# Patient Record
Sex: MALE | Race: WHITE
[De-identification: names, ages, dates, MRNs, and addresses within clinical notes are randomized per-mention and may not be internally consistent; named-entity substitution may affect disease eponyms.]

---

## 2019-09-21 ENCOUNTER — HOSPITAL ENCOUNTER (INPATIENT)
Dept: HOSPITAL 47 - EC | Age: 84
LOS: 4 days | Discharge: HOME HEALTH | DRG: 190 | End: 2019-09-25
Attending: INTERNAL MEDICINE | Admitting: INTERNAL MEDICINE
Payer: MEDICARE

## 2019-09-21 VITALS — BODY MASS INDEX: 27.9 KG/M2

## 2019-09-21 DIAGNOSIS — J20.9: ICD-10-CM

## 2019-09-21 DIAGNOSIS — I11.0: ICD-10-CM

## 2019-09-21 DIAGNOSIS — F03.90: ICD-10-CM

## 2019-09-21 DIAGNOSIS — R44.3: ICD-10-CM

## 2019-09-21 DIAGNOSIS — Z79.52: ICD-10-CM

## 2019-09-21 DIAGNOSIS — Z95.1: ICD-10-CM

## 2019-09-21 DIAGNOSIS — Z95.810: ICD-10-CM

## 2019-09-21 DIAGNOSIS — T38.0X5A: ICD-10-CM

## 2019-09-21 DIAGNOSIS — Z79.02: ICD-10-CM

## 2019-09-21 DIAGNOSIS — Z79.899: ICD-10-CM

## 2019-09-21 DIAGNOSIS — J96.01: ICD-10-CM

## 2019-09-21 DIAGNOSIS — Z86.73: ICD-10-CM

## 2019-09-21 DIAGNOSIS — I25.10: ICD-10-CM

## 2019-09-21 DIAGNOSIS — E66.9: ICD-10-CM

## 2019-09-21 DIAGNOSIS — I50.9: ICD-10-CM

## 2019-09-21 DIAGNOSIS — E78.5: ICD-10-CM

## 2019-09-21 DIAGNOSIS — J44.1: Primary | ICD-10-CM

## 2019-09-21 DIAGNOSIS — J44.0: ICD-10-CM

## 2019-09-21 DIAGNOSIS — Z95.5: ICD-10-CM

## 2019-09-21 DIAGNOSIS — R41.0: ICD-10-CM

## 2019-09-21 LAB
ALBUMIN SERPL-MCNC: 4.1 G/DL (ref 3.5–5)
ALP SERPL-CCNC: 107 U/L (ref 38–126)
ALT SERPL-CCNC: 52 U/L (ref 21–72)
ANION GAP SERPL CALC-SCNC: 15 MMOL/L
APTT BLD: 22.3 SEC (ref 22–30)
AST SERPL-CCNC: 51 U/L (ref 17–59)
BASOPHILS # BLD AUTO: 0 K/UL (ref 0–0.2)
BASOPHILS NFR BLD AUTO: 0 %
BUN SERPL-SCNC: 26 MG/DL (ref 9–20)
CALCIUM SPEC-MCNC: 9.7 MG/DL (ref 8.4–10.2)
CHLORIDE SERPL-SCNC: 103 MMOL/L (ref 98–107)
CK SERPL-CCNC: 47 U/L (ref 55–170)
CO2 SERPL-SCNC: 23 MMOL/L (ref 22–30)
EOSINOPHIL # BLD AUTO: 0.1 K/UL (ref 0–0.7)
EOSINOPHIL NFR BLD AUTO: 1 %
ERYTHROCYTE [DISTWIDTH] IN BLOOD BY AUTOMATED COUNT: 3.87 M/UL (ref 4.3–5.9)
ERYTHROCYTE [DISTWIDTH] IN BLOOD: 15.3 % (ref 11.5–15.5)
GLUCOSE SERPL-MCNC: 130 MG/DL (ref 74–99)
HCT VFR BLD AUTO: 36.7 % (ref 39–53)
HGB BLD-MCNC: 12.6 GM/DL (ref 13–17.5)
INR PPP: 1 (ref ?–1.2)
LYMPHOCYTES # SPEC AUTO: 1 K/UL (ref 1–4.8)
LYMPHOCYTES NFR SPEC AUTO: 10 %
MAGNESIUM SPEC-SCNC: 1.6 MG/DL (ref 1.6–2.3)
MCH RBC QN AUTO: 32.6 PG (ref 25–35)
MCHC RBC AUTO-ENTMCNC: 34.4 G/DL (ref 31–37)
MCV RBC AUTO: 95 FL (ref 80–100)
MONOCYTES # BLD AUTO: 0.2 K/UL (ref 0–1)
MONOCYTES NFR BLD AUTO: 3 %
NEUTROPHILS # BLD AUTO: 8.5 K/UL (ref 1.3–7.7)
NEUTROPHILS NFR BLD AUTO: 85 %
PLATELET # BLD AUTO: 212 K/UL (ref 150–450)
POTASSIUM SERPL-SCNC: 4.4 MMOL/L (ref 3.5–5.1)
PROT SERPL-MCNC: 8.9 G/DL (ref 6.3–8.2)
PT BLD: 10.6 SEC (ref 9–12)
SODIUM SERPL-SCNC: 141 MMOL/L (ref 137–145)
WBC # BLD AUTO: 9.9 K/UL (ref 3.8–10.6)

## 2019-09-21 PROCEDURE — 96375 TX/PRO/DX INJ NEW DRUG ADDON: CPT

## 2019-09-21 PROCEDURE — 85610 PROTHROMBIN TIME: CPT

## 2019-09-21 PROCEDURE — 80048 BASIC METABOLIC PNL TOTAL CA: CPT

## 2019-09-21 PROCEDURE — 96365 THER/PROPH/DIAG IV INF INIT: CPT

## 2019-09-21 PROCEDURE — 80053 COMPREHEN METABOLIC PANEL: CPT

## 2019-09-21 PROCEDURE — 71046 X-RAY EXAM CHEST 2 VIEWS: CPT

## 2019-09-21 PROCEDURE — 87040 BLOOD CULTURE FOR BACTERIA: CPT

## 2019-09-21 PROCEDURE — 85730 THROMBOPLASTIN TIME PARTIAL: CPT

## 2019-09-21 PROCEDURE — 85025 COMPLETE CBC W/AUTO DIFF WBC: CPT

## 2019-09-21 PROCEDURE — 94760 N-INVAS EAR/PLS OXIMETRY 1: CPT

## 2019-09-21 PROCEDURE — 84484 ASSAY OF TROPONIN QUANT: CPT

## 2019-09-21 PROCEDURE — 94640 AIRWAY INHALATION TREATMENT: CPT

## 2019-09-21 PROCEDURE — 82550 ASSAY OF CK (CPK): CPT

## 2019-09-21 PROCEDURE — 36415 COLL VENOUS BLD VENIPUNCTURE: CPT

## 2019-09-21 PROCEDURE — 99285 EMERGENCY DEPT VISIT HI MDM: CPT

## 2019-09-21 PROCEDURE — 83880 ASSAY OF NATRIURETIC PEPTIDE: CPT

## 2019-09-21 PROCEDURE — 83735 ASSAY OF MAGNESIUM: CPT

## 2019-09-21 PROCEDURE — 93005 ELECTROCARDIOGRAM TRACING: CPT

## 2019-09-21 RX ADMIN — MIRTAZAPINE SCH MG: 15 TABLET, FILM COATED ORAL at 20:09

## 2019-09-21 RX ADMIN — METHYLPREDNISOLONE SODIUM SUCCINATE SCH MG: 40 INJECTION, POWDER, FOR SOLUTION INTRAMUSCULAR; INTRAVENOUS at 23:26

## 2019-09-21 RX ADMIN — LISINOPRIL SCH MG: 20 TABLET ORAL at 20:10

## 2019-09-21 RX ADMIN — IPRATROPIUM BROMIDE AND ALBUTEROL SULFATE SCH ML: .5; 3 SOLUTION RESPIRATORY (INHALATION) at 15:30

## 2019-09-21 RX ADMIN — ATENOLOL SCH MG: 50 TABLET ORAL at 20:10

## 2019-09-21 RX ADMIN — ATORVASTATIN CALCIUM SCH MG: 20 TABLET, FILM COATED ORAL at 20:10

## 2019-09-21 RX ADMIN — Medication SCH MG: at 20:10

## 2019-09-21 RX ADMIN — IPRATROPIUM BROMIDE AND ALBUTEROL SULFATE SCH ML: .5; 3 SOLUTION RESPIRATORY (INHALATION) at 20:17

## 2019-09-21 NOTE — XR
EXAMINATION TYPE: XR chest 2V

 

DATE OF EXAM: 9/21/2019

 

HISTORY: difficulty breathing.

 

REFERENCE: NONE.

 

FINDINGS: There has been a midline sternotomy. There is a bipolar pacemaker place on the left.

 

The heart is mildly enlarged. The lungs are mildly overinflated but clear. Pleural space are clear.

 

IMPRESSION: 

1. COPD.

2. MILD CARDIOMEGALY.

## 2019-09-21 NOTE — ED
SOB HPI





- General


Chief Complaint: Shortness of Breath


Stated Complaint: SOB


Time Seen by Provider: 09/21/19 11:53


Source: patient, RN notes reviewed, old records reviewed


Mode of arrival: ambulatory


Limitations: no limitations





- History of Present Illness


Initial Comments: 





This is a 5-year-old male the ER for evaluation patient's a poor historian malcolm hector to dementia.  Patient presents with daughter from a  care facility for 

evaluation regarding low blood pressure increasing shortness of breath and left 

breast for infection with cough.  Patient himself is not getting any complaints 

currently.  But daughter states patient has had significant shortness of breath 

and had a cough for about a week may or may not have had a fever she is unsure. 

EMS records as well as transfer paperwork is patient is a nonsmoker with no 

history of COPD, no known significant sick contacts.  [facility] does [facility] 

[facility]


MD Complaint: shortness of breath, cough


-: week(s)


Severity: mild


Consistency: constant


Worsens With: exertion


Known History Of: congestive heart failure


Context: recent URI


Associated Symptoms: denies other symptoms





- Related Data


                                    Allergies











Allergy/AdvReac Type Severity Reaction Status Date / Time


 


Penicillins Allergy  Unknown Verified 09/21/19 11:51














Review of Systems


ROS Statement: 


Those systems with pertinent positive or pertinent negative responses have been 

documented in the HPI.





ROS Other: All systems not noted in ROS Statement are negative.





Past Medical History


Past Medical History: Chest Pain / Angina, Heart Failure, Hyperlipidemia, 

Hypertension


History of Any Multi-Drug Resistant Organisms: None Reported


Past Surgical History: No Surgical Hx Reported


Past Psychological History: No Psychological Hx Reported


Smoking Status: Never smoker


Past Alcohol Use History: None Reported


Past Drug Use History: None Reported





General Exam


Limitations: no limitations


General appearance: alert, in no apparent distress


Head exam: Present: atraumatic, normocephalic, normal inspection


Eye exam: Present: normal appearance, PERRL, EOMI.  Absent: scleral icterus, 

conjunctival injection, periorbital swelling


ENT exam: Present: normal exam, mucous membranes dry


Neck exam: Present: normal inspection.  Absent: tenderness, meningismus, 

lymphadenopathy


Respiratory exam: Present: respiratory distress, wheezes, rhonchi, accessory 

muscle use, decreased breath sounds, prolonged expiratory.  Absent: rales, 

stridor


Cardiovascular Exam: Present: regular rate, normal rhythm, normal heart sounds. 

Absent: systolic murmur, diastolic murmur, rubs, gallop, clicks


GI/Abdominal exam: Present: soft, normal bowel sounds.  Absent: distended, 

tenderness, guarding, rebound, rigid


Extremities exam: Present: normal inspection, full ROM, normal capillary refill.

 Absent: tenderness, pedal edema, joint swelling, calf tenderness


Back exam: Present: normal inspection


Neurological exam: Present: alert, oriented X3, CN II-XII intact


Psychiatric exam: Present: normal affect, normal mood


Skin exam: Present: warm, dry, intact, normal color.  Absent: rash





Course


                                   Vital Signs











  09/21/19 09/21/19 09/21/19





  11:47 12:32 12:50


 


Temperature 97.7 F  


 


Pulse Rate 71 93 99


 


Respiratory 24  





Rate   


 


Blood Pressure 161/78  


 


O2 Sat by Pulse 91 L  





Oximetry   














- Reevaluation(s)


Reevaluation #1: 





09/21/19 12:32


Medical records reviewed


Reevaluation #2: 





09/21/19 13:30


 does feel better with acute breathing treatment





Medical Decision Making





- Medical Decision Making





85 male the admitted for COPD exacerbation,.  Breathing Treatments and steroids





- Lab Data


Result diagrams: 


                                 09/21/19 12:15





                                 09/21/19 12:15


                                   Lab Results











  09/21/19 09/21/19 09/21/19 Range/Units





  12:15 12:15 12:15 


 


WBC  9.9    (3.8-10.6)  k/uL


 


RBC  3.87 L    (4.30-5.90)  m/uL


 


Hgb  12.6 L    (13.0-17.5)  gm/dL


 


Hct  36.7 L    (39.0-53.0)  %


 


MCV  95.0    (80.0-100.0)  fL


 


MCH  32.6    (25.0-35.0)  pg


 


MCHC  34.4    (31.0-37.0)  g/dL


 


RDW  15.3    (11.5-15.5)  %


 


Plt Count  212    (150-450)  k/uL


 


Neutrophils %  85    %


 


Lymphocytes %  10    %


 


Monocytes %  3    %


 


Eosinophils %  1    %


 


Basophils %  0    %


 


Neutrophils #  8.5 H    (1.3-7.7)  k/uL


 


Lymphocytes #  1.0    (1.0-4.8)  k/uL


 


Monocytes #  0.2    (0-1.0)  k/uL


 


Eosinophils #  0.1    (0-0.7)  k/uL


 


Basophils #  0.0    (0-0.2)  k/uL


 


PT    10.6  (9.0-12.0)  sec


 


INR    1.0  (<1.2)  


 


APTT    22.3  (22.0-30.0)  sec


 


Sodium   141   (137-145)  mmol/L


 


Potassium   4.4   (3.5-5.1)  mmol/L


 


Chloride   103   ()  mmol/L


 


Carbon Dioxide   23   (22-30)  mmol/L


 


Anion Gap   15   mmol/L


 


BUN   26 H   (9-20)  mg/dL


 


Creatinine   1.35 H   (0.66-1.25)  mg/dL


 


Est GFR (CKD-EPI)AfAm   55   (>60 ml/min/1.73 sqM)  


 


Est GFR (CKD-EPI)NonAf   48   (>60 ml/min/1.73 sqM)  


 


Glucose   130 H   (74-99)  mg/dL


 


Calcium   9.7   (8.4-10.2)  mg/dL


 


Magnesium   1.6   (1.6-2.3)  mg/dL


 


Total Bilirubin   0.5   (0.2-1.3)  mg/dL


 


AST   51   (17-59)  U/L


 


ALT   52   (21-72)  U/L


 


Alkaline Phosphatase   107   ()  U/L


 


Creatine Kinase   47 L   ()  U/L


 


Troponin I     (0.000-0.034)  ng/mL


 


Total Protein   8.9 H   (6.3-8.2)  g/dL


 


Albumin   4.1   (3.5-5.0)  g/dL














  09/21/19 Range/Units





  12:15 


 


WBC   (3.8-10.6)  k/uL


 


RBC   (4.30-5.90)  m/uL


 


Hgb   (13.0-17.5)  gm/dL


 


Hct   (39.0-53.0)  %


 


MCV   (80.0-100.0)  fL


 


MCH   (25.0-35.0)  pg


 


MCHC   (31.0-37.0)  g/dL


 


RDW   (11.5-15.5)  %


 


Plt Count   (150-450)  k/uL


 


Neutrophils %   %


 


Lymphocytes %   %


 


Monocytes %   %


 


Eosinophils %   %


 


Basophils %   %


 


Neutrophils #   (1.3-7.7)  k/uL


 


Lymphocytes #   (1.0-4.8)  k/uL


 


Monocytes #   (0-1.0)  k/uL


 


Eosinophils #   (0-0.7)  k/uL


 


Basophils #   (0-0.2)  k/uL


 


PT   (9.0-12.0)  sec


 


INR   (<1.2)  


 


APTT   (22.0-30.0)  sec


 


Sodium   (137-145)  mmol/L


 


Potassium   (3.5-5.1)  mmol/L


 


Chloride   ()  mmol/L


 


Carbon Dioxide   (22-30)  mmol/L


 


Anion Gap   mmol/L


 


BUN   (9-20)  mg/dL


 


Creatinine   (0.66-1.25)  mg/dL


 


Est GFR (CKD-EPI)AfAm   (>60 ml/min/1.73 sqM)  


 


Est GFR (CKD-EPI)NonAf   (>60 ml/min/1.73 sqM)  


 


Glucose   (74-99)  mg/dL


 


Calcium   (8.4-10.2)  mg/dL


 


Magnesium   (1.6-2.3)  mg/dL


 


Total Bilirubin   (0.2-1.3)  mg/dL


 


AST   (17-59)  U/L


 


ALT   (21-72)  U/L


 


Alkaline Phosphatase   ()  U/L


 


Creatine Kinase   ()  U/L


 


Troponin I  0.013  (0.000-0.034)  ng/mL


 


Total Protein   (6.3-8.2)  g/dL


 


Albumin   (3.5-5.0)  g/dL














- EKG Data


-: EKG Interpreted by Me (EKG shows paced rhythm rate of 65, , QRS 90, QTc

422)





- Radiology Data


Radiology results: report reviewed (Chest x-rays negative for acute disease), 

image reviewed





Disposition


Clinical Impression: 


 Acute exacerbation of chronic obstructive pulmonary disease





Disposition: ADMITTED AS IP TO THIS HOSP


Condition: Fair


Is patient prescribed a controlled substance at d/c from ED?: No


Referrals: 


Gaurang Mack MD [Primary Care Provider] - 1-2 days

## 2019-09-21 NOTE — P.HPIM
History of Present Illness


H&P Date: 09/21/19


Chief Complaint: Shortness of breath





Patient is a 85-year-old male with a known history of coronary artery disease 

with history of stent placement, history of CABG several years ago, chronic CHF 

with ejection fraction unknown, history of AICD placement, hypertension, hyperli

pidemia and dementia was brought to the hospital from senior living facility due

to complaints of shortness of breath and cough.  Patient was also to have low 

blood pressure at that facility.  Patient has been having significant shortness 

of breath and cough.  Past 1 week.  Patient was having subjective fevers which 

is unsure.  Patient was started on azithromycin and steroid prednisone but 

without much improvement.  Patient was also having leg swelling but it is not 

worsening recently.


Patient does not have a history of COPD or history of smoking in the past.


No fever admission.  No complaints of chest pain.  No nausea vomiting or 

abdominal pain or diarrhea.  No headache or dizziness or lightheadedness.





X-ray showed COPD and mild cardiomegaly.


EKG showed ventricular pacer rhythm.





Patient was given DuoNeb's breathing treatments and Solu-Medrol and IV fluid 

bolus in the ER.





BNP 2060





Review of Systems





Constitutional: Patient denies any fever or chills .  No generalized weakness or

weight loss.  


Abdomen: Patient denied nausea vomiting and diarrhea and abdominal pain.


Cardiovascular: Patient denies any chest pain or short of breath no pa

lpitations.  Swelling.


Respiratory: Patient does have cough without much sputum production.  Does have 

shortness of breath


Neurologic: Patient denied any numbness or tingling headache.


Musculoskeletal: Patient denies any complaints of joint swelling or deformity.


Skin: Negative


Psychiatric: Negative


Endocrine: No heat or cold intolerance.  No recent weight gain.


Genitourinary: No dysuria or hematuria.


All other 14 point ROS negative except the above





Past Medical History


Past Medical History: Cancer, Chest Pain / Angina, Heart Failure, CVA/TIA, 

Hyperlipidemia, Hypertension, Memory Impairment


History of Any Multi-Drug Resistant Organisms: None Reported


Past Surgical History: Heart Catheterization With Stent, Pacemaker


Additional Past Surgical History / Comment(s): Open Heart surgery


Past Anesthesia/Blood Transfusion Reactions: No Reported Reaction


Date of Last Stent Placement:: 2015


Type of Cardiac Device: Permanent Pacemaker


Device Placement Date:: 2015


Past Psychological History: No Psychological Hx Reported


Smoking Status: Never smoker


Past Alcohol Use History: None Reported


Past Drug Use History: None Reported





- Past Family History


  ** Father


Family Medical History: No Reported History





Medications and Allergies


                                Home Medications











 Medication  Instructions  Recorded  Confirmed  Type


 


Acetaminophen [Tylenol] 500 - 1,000 mg PO BID PRN 09/21/19 09/21/19 History


 


Albuterol Inhaler [Ventolin Hfa 1 - 2 puff INHALATION RT-Q6H PRN 09/21/19 09/21/19 History





Inhaler]    


 


Atenolol [Tenormin] 50 mg PO BID@0730,2000 09/21/19 09/21/19 History


 


Atorvastatin [Lipitor] 20 mg PO HS@2000 09/21/19 09/21/19 History


 


Azithromycin 250 mg PO DAILY@0800 09/21/19 09/21/19 History


 


Cholecalciferol (Vitamin D3) 2,000 unit PO DAILY@0730 09/21/19 09/21/19 History





[Vitamin D3]    


 


Clopidogrel Bisulfate [Plavix] 75 mg PO DAILY@0730 09/21/19 09/21/19 History


 


Enalapril Maleate [Vasotec] 20 mg PO BID@0800,2000 09/21/19 09/21/19 History


 


Folic Acid 0.4 mg PO DAILY@0730 09/21/19 09/21/19 History


 


Guaifenesin/Dextromethorphan 5 ml PO Q46H PRN 09/21/19 09/21/19 History





[guaiFENesin DM]    


 


Loratadine 10 mg PO DAILY@0800 09/21/19 09/21/19 History


 


Melatonin 10 mg PO HS@2000 09/21/19 09/21/19 History


 


Mirtazapine 30 mg PO HS@2000 09/21/19 09/21/19 History


 


amLODIPine [Norvasc] 5 mg PO BID@0730,2000 09/21/19 09/21/19 History


 


predniSONE 20 mg PO DAILY@0800 09/21/19 09/21/19 History








                                    Allergies











Allergy/AdvReac Type Severity Reaction Status Date / Time


 


Penicillins Allergy  Unknown Verified 09/21/19 13:31














Physical Exam


Vitals: 


                                   Vital Signs











  Temp Pulse Pulse Resp BP BP Pulse Ox


 


 09/21/19 15:41   88     


 


 09/21/19 15:30   92     


 


 09/21/19 15:25  98.1 F   70  16   160/76  95


 


 09/21/19 14:32   67   18  146/88   96


 


 09/21/19 13:58   65   18    91 L


 


 09/21/19 13:37  97.9 F  66   18  132/71   97


 


 09/21/19 12:50   99     


 


 09/21/19 12:32   93     


 


 09/21/19 11:47  97.7 F  71   24  161/78   91 L








                                Intake and Output











 09/21/19 09/21/19 09/21/19





 06:59 14:59 22:59


 


Other:   


 


  Weight  88.451 kg 














PHYSICAL EXAMINATION: 


Patient is lying in the bed comfortably, no acute distress, awake alert and 

oriented.  She is lethargic. 


HEENT: Normocephalic. Neck is supple. Pupils reactive. Nostrils clear. Oral 

cavity is moist. Ears reveal no drainage. 


Neck reveals no JVD, carotid bruits, or thyromegaly. 


CHEST EXAMINATION: Trachea is central. Symmetrical expansion.  Bilateral 

diminished air entry and expiratory wheezing and prolonged expiration.  No 

crackles or rhonchi.


CARDIAC: Normal S1, S2 with no gallops. No murmurs 


ABDOMEN: Soft. Bowel sounds normal. No organomegaly. No abdominal bruits. 


Extremities: 2+ edema.  No clubbing or cyanosis


Neurologically awake, alert, oriented x2-3 with well-coordinated movements.  No 

focal deficits noted.  Patient does have underlying cognitive impairment.


Skin: No rash or skin lesions. 


Psychiatric: Coperative.  Nonsuicidal


Musculoskeletal: No joint swelling or deformity.  Normal range of motion.








Results


CBC & Chem 7: 


                                 09/21/19 12:15





                                 09/21/19 12:15


Labs: 


                  Abnormal Lab Results - Last 24 Hours (Table)











  09/21/19 09/21/19 Range/Units





  12:15 12:15 


 


RBC  3.87 L   (4.30-5.90)  m/uL


 


Hgb  12.6 L   (13.0-17.5)  gm/dL


 


Hct  36.7 L   (39.0-53.0)  %


 


Neutrophils #  8.5 H   (1.3-7.7)  k/uL


 


BUN   26 H  (9-20)  mg/dL


 


Creatinine   1.35 H  (0.66-1.25)  mg/dL


 


Glucose   130 H  (74-99)  mg/dL


 


Creatine Kinase   47 L  ()  U/L


 


Total Protein   8.9 H  (6.3-8.2)  g/dL














Thrombosis Risk Factor Assmnt





- DVT/VTE Prophylaxis


DVT/VTE Prophylaxis: Pharmacologic Prophylaxis ordered





- Choose All That Apply


Any of the Below Risk Factors Present?: Yes


Each Factor Represents 1 point: Abnormal pulmonary function (COPD), Obesity (BMI

 >25)


Other Risk Factors: No


Other congenital or acquired thrombophilia - If yes, enter type in comment: No


Thrombosis Risk Factor Assessment Total Risk Factor Score: 2


Thrombosis Risk Factor Assessment Level: Low Risk





Assessment and Plan


Assessment: 





Acute tracheobronchitis with bronchospasm.  Possible underlying COPD with 

exacerbation.  Failed outpatient therapy.


Acute hypoxic respiratory failure secondary to above.


History of coronary artery disease status post CABG


History of AICD placement


History of CVA/TIA.


Hypertension


Dementia/memory impairment


Chronic CHF with ejection fraction unknown.


 DVT prophylaxis with Lovenox.





Plan:


Agent will be continued on IV Solu-Medrol and duo nebs.  Oxygen therapy.  Will 

hold IV fluids.  With antibiotics in the form of Levaquin.


Chest x-ray showed no evidence of CHF.  BNP is not significantly elevated.  Will

 continue the blood pressure medications and adjust dose.


Further recommendations based on the clinical course.  Prognosis is guarded.


Discussed with his son at bedside in detail.








Time with Patient: Greater than 30

## 2019-09-22 LAB
ANION GAP SERPL CALC-SCNC: 15 MMOL/L
BASOPHILS # BLD AUTO: 0 K/UL (ref 0–0.2)
BASOPHILS NFR BLD AUTO: 0 %
BUN SERPL-SCNC: 33 MG/DL (ref 9–20)
CALCIUM SPEC-MCNC: 9.6 MG/DL (ref 8.4–10.2)
CHLORIDE SERPL-SCNC: 101 MMOL/L (ref 98–107)
CO2 SERPL-SCNC: 25 MMOL/L (ref 22–30)
EOSINOPHIL # BLD AUTO: 0 K/UL (ref 0–0.7)
EOSINOPHIL NFR BLD AUTO: 0 %
ERYTHROCYTE [DISTWIDTH] IN BLOOD BY AUTOMATED COUNT: 3.64 M/UL (ref 4.3–5.9)
ERYTHROCYTE [DISTWIDTH] IN BLOOD: 15.2 % (ref 11.5–15.5)
GLUCOSE SERPL-MCNC: 147 MG/DL (ref 74–99)
HCT VFR BLD AUTO: 34.9 % (ref 39–53)
HGB BLD-MCNC: 11.3 GM/DL (ref 13–17.5)
LYMPHOCYTES # SPEC AUTO: 0.8 K/UL (ref 1–4.8)
LYMPHOCYTES NFR SPEC AUTO: 7 %
MCH RBC QN AUTO: 31 PG (ref 25–35)
MCHC RBC AUTO-ENTMCNC: 32.3 G/DL (ref 31–37)
MCV RBC AUTO: 95.8 FL (ref 80–100)
MONOCYTES # BLD AUTO: 0.3 K/UL (ref 0–1)
MONOCYTES NFR BLD AUTO: 3 %
NEUTROPHILS # BLD AUTO: 9.8 K/UL (ref 1.3–7.7)
NEUTROPHILS NFR BLD AUTO: 89 %
PLATELET # BLD AUTO: 220 K/UL (ref 150–450)
POTASSIUM SERPL-SCNC: 4.8 MMOL/L (ref 3.5–5.1)
SODIUM SERPL-SCNC: 141 MMOL/L (ref 137–145)
WBC # BLD AUTO: 10.9 K/UL (ref 3.8–10.6)

## 2019-09-22 RX ADMIN — IPRATROPIUM BROMIDE AND ALBUTEROL SULFATE SCH ML: .5; 3 SOLUTION RESPIRATORY (INHALATION) at 08:49

## 2019-09-22 RX ADMIN — ATORVASTATIN CALCIUM SCH MG: 20 TABLET, FILM COATED ORAL at 20:10

## 2019-09-22 RX ADMIN — METHYLPREDNISOLONE SODIUM SUCCINATE SCH MG: 40 INJECTION, POWDER, FOR SOLUTION INTRAMUSCULAR; INTRAVENOUS at 09:04

## 2019-09-22 RX ADMIN — ATENOLOL SCH MG: 50 TABLET ORAL at 20:10

## 2019-09-22 RX ADMIN — LORATADINE SCH MG: 10 TABLET ORAL at 09:03

## 2019-09-22 RX ADMIN — MIRTAZAPINE SCH MG: 15 TABLET, FILM COATED ORAL at 20:10

## 2019-09-22 RX ADMIN — LISINOPRIL SCH MG: 20 TABLET ORAL at 20:10

## 2019-09-22 RX ADMIN — Medication SCH MG: at 20:10

## 2019-09-22 RX ADMIN — ATENOLOL SCH MG: 50 TABLET ORAL at 09:02

## 2019-09-22 RX ADMIN — IPRATROPIUM BROMIDE AND ALBUTEROL SULFATE SCH ML: .5; 3 SOLUTION RESPIRATORY (INHALATION) at 16:23

## 2019-09-22 RX ADMIN — LISINOPRIL SCH MG: 20 TABLET ORAL at 09:02

## 2019-09-22 RX ADMIN — METHYLPREDNISOLONE SODIUM SUCCINATE SCH MG: 40 INJECTION, POWDER, FOR SOLUTION INTRAMUSCULAR; INTRAVENOUS at 22:55

## 2019-09-22 RX ADMIN — Medication SCH UNIT: at 09:04

## 2019-09-22 RX ADMIN — METHYLPREDNISOLONE SODIUM SUCCINATE SCH MG: 40 INJECTION, POWDER, FOR SOLUTION INTRAMUSCULAR; INTRAVENOUS at 16:53

## 2019-09-22 RX ADMIN — IPRATROPIUM BROMIDE AND ALBUTEROL SULFATE SCH ML: .5; 3 SOLUTION RESPIRATORY (INHALATION) at 20:38

## 2019-09-22 RX ADMIN — FOLIC ACID SCH MG: 1 TABLET ORAL at 09:02

## 2019-09-22 RX ADMIN — ENOXAPARIN SODIUM SCH MG: 40 INJECTION SUBCUTANEOUS at 09:04

## 2019-09-22 RX ADMIN — IPRATROPIUM BROMIDE AND ALBUTEROL SULFATE SCH ML: .5; 3 SOLUTION RESPIRATORY (INHALATION) at 12:50

## 2019-09-22 RX ADMIN — CLOPIDOGREL BISULFATE SCH MG: 75 TABLET ORAL at 09:02

## 2019-09-23 LAB
ANION GAP SERPL CALC-SCNC: 12 MMOL/L
BASOPHILS # BLD AUTO: 0 K/UL (ref 0–0.2)
BASOPHILS NFR BLD AUTO: 0 %
BUN SERPL-SCNC: 46 MG/DL (ref 9–20)
CALCIUM SPEC-MCNC: 9.7 MG/DL (ref 8.4–10.2)
CHLORIDE SERPL-SCNC: 101 MMOL/L (ref 98–107)
CO2 SERPL-SCNC: 27 MMOL/L (ref 22–30)
EOSINOPHIL # BLD AUTO: 0 K/UL (ref 0–0.7)
EOSINOPHIL NFR BLD AUTO: 0 %
ERYTHROCYTE [DISTWIDTH] IN BLOOD BY AUTOMATED COUNT: 3.64 M/UL (ref 4.3–5.9)
ERYTHROCYTE [DISTWIDTH] IN BLOOD: 16.5 % (ref 11.5–15.5)
GLUCOSE SERPL-MCNC: 141 MG/DL (ref 74–99)
HCT VFR BLD AUTO: 34.6 % (ref 39–53)
HGB BLD-MCNC: 11.4 GM/DL (ref 13–17.5)
LYMPHOCYTES # SPEC AUTO: 0.7 K/UL (ref 1–4.8)
LYMPHOCYTES NFR SPEC AUTO: 4 %
MCH RBC QN AUTO: 31.4 PG (ref 25–35)
MCHC RBC AUTO-ENTMCNC: 33 G/DL (ref 31–37)
MCV RBC AUTO: 95.2 FL (ref 80–100)
MONOCYTES # BLD AUTO: 0.8 K/UL (ref 0–1)
MONOCYTES NFR BLD AUTO: 5 %
NEUTROPHILS # BLD AUTO: 15.2 K/UL (ref 1.3–7.7)
NEUTROPHILS NFR BLD AUTO: 90 %
PLATELET # BLD AUTO: 225 K/UL (ref 150–450)
POTASSIUM SERPL-SCNC: 4.9 MMOL/L (ref 3.5–5.1)
SODIUM SERPL-SCNC: 140 MMOL/L (ref 137–145)
WBC # BLD AUTO: 16.8 K/UL (ref 3.8–10.6)

## 2019-09-23 RX ADMIN — ENOXAPARIN SODIUM SCH MG: 40 INJECTION SUBCUTANEOUS at 10:26

## 2019-09-23 RX ADMIN — LORATADINE SCH MG: 10 TABLET ORAL at 10:28

## 2019-09-23 RX ADMIN — CLOPIDOGREL BISULFATE SCH MG: 75 TABLET ORAL at 10:27

## 2019-09-23 RX ADMIN — Medication SCH UNIT: at 10:27

## 2019-09-23 RX ADMIN — IPRATROPIUM BROMIDE AND ALBUTEROL SULFATE SCH ML: .5; 3 SOLUTION RESPIRATORY (INHALATION) at 21:06

## 2019-09-23 RX ADMIN — IPRATROPIUM BROMIDE AND ALBUTEROL SULFATE SCH ML: .5; 3 SOLUTION RESPIRATORY (INHALATION) at 08:30

## 2019-09-23 RX ADMIN — ATENOLOL SCH MG: 50 TABLET ORAL at 20:37

## 2019-09-23 RX ADMIN — METHYLPREDNISOLONE SODIUM SUCCINATE SCH MG: 40 INJECTION, POWDER, FOR SOLUTION INTRAMUSCULAR; INTRAVENOUS at 10:26

## 2019-09-23 RX ADMIN — LISINOPRIL SCH MG: 20 TABLET ORAL at 20:37

## 2019-09-23 RX ADMIN — METHYLPREDNISOLONE SODIUM SUCCINATE SCH MG: 40 INJECTION, POWDER, FOR SOLUTION INTRAMUSCULAR; INTRAVENOUS at 23:47

## 2019-09-23 RX ADMIN — ATORVASTATIN CALCIUM SCH MG: 20 TABLET, FILM COATED ORAL at 20:37

## 2019-09-23 RX ADMIN — ATENOLOL SCH MG: 50 TABLET ORAL at 10:27

## 2019-09-23 RX ADMIN — MIRTAZAPINE SCH MG: 15 TABLET, FILM COATED ORAL at 20:37

## 2019-09-23 RX ADMIN — Medication SCH MG: at 20:39

## 2019-09-23 RX ADMIN — FOLIC ACID SCH MG: 1 TABLET ORAL at 10:27

## 2019-09-23 RX ADMIN — IPRATROPIUM BROMIDE AND ALBUTEROL SULFATE SCH ML: .5; 3 SOLUTION RESPIRATORY (INHALATION) at 15:40

## 2019-09-23 RX ADMIN — METHYLPREDNISOLONE SODIUM SUCCINATE SCH MG: 40 INJECTION, POWDER, FOR SOLUTION INTRAMUSCULAR; INTRAVENOUS at 16:50

## 2019-09-23 RX ADMIN — IPRATROPIUM BROMIDE AND ALBUTEROL SULFATE SCH ML: .5; 3 SOLUTION RESPIRATORY (INHALATION) at 12:31

## 2019-09-23 RX ADMIN — LISINOPRIL SCH MG: 20 TABLET ORAL at 10:26

## 2019-09-23 NOTE — P.PN
Subjective


Progress Note Date: 09/22/19


Principal diagnosis: 





Acute COPD exacerbation


Tracheobronchitis.  Failed outpatient therapy





Patient is a 85-year-old male with a known history of coronary artery disease 

with history of stent placement, history of CABG several years ago, chronic CHF 

with ejection fraction unknown, history of AICD placement, hypertension, 

hyperlipidemia and dementia was brought to the hospital from senior living 

facility due to complaints of shortness of breath and cough.  Patient was also 

to have low blood pressure at that facility.  Patient has been having 

significant shortness of breath and cough.  Past 1 week.  Patient was having 

subjective fevers which is unsure.  Patient was started on azithromycin and 

steroid prednisone but without much improvement.  Patient was also having leg sw

elling but it is not worsening recently.


Patient does not have a history of COPD or history of smoking in the past.


No fever admission.  No complaints of chest pain.  No nausea vomiting or 

abdominal pain or diarrhea.  No headache or dizziness or lightheadedness.





X-ray showed COPD and mild cardiomegaly.


EKG showed ventricular pacer rhythm.





Patient was given DuoNeb's breathing treatments and Solu-Medrol and IV fluid 

bolus in the ER.





BNP 2060 09/22/2019


Patient is currently sitting in the bed comfortably and is tolerating oral diet.

 Breathing status is much improved now able to walk to the bathroom but still 

dyspneic compared to normal.  Patient is being converted on IV steroids and 

breathing treatments.  Antibiotics in the form of Levaquin.


Continue with the oxygen therapy and gradually titrate down.  Anticipate 

discharge in next 24-48 hours.





Medications reviewed.





Objective





- Vital Signs


Vital signs: 


                                   Vital Signs











Temp  98.4 F   09/22/19 07:00


 


Pulse  80   09/22/19 16:35


 


Resp  16   09/22/19 07:00


 


BP  165/77   09/22/19 07:00


 


Pulse Ox  92 L  09/22/19 16:26








                                 Intake & Output











 09/21/19 09/22/19 09/22/19





 18:59 06:59 18:59


 


Intake Total  296 920


 


Balance  296 920


 


Weight 88.451 kg  


 


Intake:   


 


  Oral  296 920


 


Other:   


 


  Voiding Method Toilet Incontinent Incontinent


 


  # Voids  2 














- Exam





PHYSICAL EXAMINATION: 


Patient is lying in the bed comfortably, no acute distress, awake alert and 

oriented.. 


HEENT: Normocephalic. Neck is supple. Pupils reactive. Nostrils clear. Oral 

cavity is moist. Ears reveal no drainage. 


Neck reveals no JVD, carotid bruits, or thyromegaly. 


CHEST EXAMINATION: Trachea is central. Symmetrical expansion.  Expiratory wheeze

present.  Scattered rhonchi.  Otherwise Lung fields clear to auscultation and 

percussion. 


CARDIAC: Normal S1, S2 with no gallops. No murmurs 


ABDOMEN: Soft. Bowel sounds normal. No organomegaly. No abdominal bruits. 


Extremities: reveal no edema.  No clubbing or cyanosis


Neurologically awake, alert, oriented x3 with well-coordinated movements.  No 

focal deficits noted


Skin: No rash or skin lesions. 


Psychiatric: Coperative.  Nonsuicidal


Musculoskeletal: No joint swelling or deformity.  Normal range of motion.








- Labs


CBC & Chem 7: 


                                 09/22/19 06:06





                                 09/22/19 06:06


Labs: 


                  Abnormal Lab Results - Last 24 Hours (Table)











  09/22/19 09/22/19 Range/Units





  06:06 06:06 


 


WBC  10.9 H   (3.8-10.6)  k/uL


 


RBC  3.64 L   (4.30-5.90)  m/uL


 


Hgb  11.3 L   (13.0-17.5)  gm/dL


 


Hct  34.9 L   (39.0-53.0)  %


 


Neutrophils #  9.8 H   (1.3-7.7)  k/uL


 


Lymphocytes #  0.8 L   (1.0-4.8)  k/uL


 


BUN   33 H  (9-20)  mg/dL


 


Creatinine   1.65 H  (0.66-1.25)  mg/dL


 


Glucose   147 H  (74-99)  mg/dL








                      Microbiology - Last 24 Hours (Table)











 09/21/19 12:15 Blood Culture - Preliminary





 Blood    No Growth after 24 hours














Assessment and Plan


Assessment: 





Acute tracheobronchitis with bronchospasm.  Possible underlying COPD with 

exacerbation.  Failed outpatient therapy.


Acute hypoxic respiratory failure secondary to above.


History of coronary artery disease status post CABG


History of AICD placement


History of CVA/TIA.


Hypertension


Dementia/memory impairment


Chronic CHF with ejection fraction unknown.


 DVT prophylaxis with Lovenox.





Plan:


Pt will be continued on IV Solu-Medrol and duo nebs.  Oxygen therapy.  Will hold

IV fluids.  With antibiotics in the form of Levaquin.


Chest x-ray showed no evidence of CHF.  BNP is not significantly elevated.  Will

continue the blood pressure medications and adjust dose.


Further recommendations based on the clinical course.  Prognosis is guarded.


Discussed with his son at bedside in detail.








Time with Patient: Greater than 30

## 2019-09-24 LAB
ANION GAP SERPL CALC-SCNC: 12 MMOL/L
BASOPHILS # BLD AUTO: 0 K/UL (ref 0–0.2)
BASOPHILS NFR BLD AUTO: 0 %
BUN SERPL-SCNC: 56 MG/DL (ref 9–20)
CALCIUM SPEC-MCNC: 9.6 MG/DL (ref 8.4–10.2)
CHLORIDE SERPL-SCNC: 104 MMOL/L (ref 98–107)
CO2 SERPL-SCNC: 26 MMOL/L (ref 22–30)
EOSINOPHIL # BLD AUTO: 0.1 K/UL (ref 0–0.7)
EOSINOPHIL NFR BLD AUTO: 1 %
ERYTHROCYTE [DISTWIDTH] IN BLOOD BY AUTOMATED COUNT: 3.75 M/UL (ref 4.3–5.9)
ERYTHROCYTE [DISTWIDTH] IN BLOOD: 15.2 % (ref 11.5–15.5)
GLUCOSE SERPL-MCNC: 148 MG/DL (ref 74–99)
HCT VFR BLD AUTO: 35.9 % (ref 39–53)
HGB BLD-MCNC: 11.5 GM/DL (ref 13–17.5)
LYMPHOCYTES # SPEC AUTO: 0.5 K/UL (ref 1–4.8)
LYMPHOCYTES NFR SPEC AUTO: 4 %
MCH RBC QN AUTO: 30.6 PG (ref 25–35)
MCHC RBC AUTO-ENTMCNC: 32 G/DL (ref 31–37)
MCV RBC AUTO: 95.7 FL (ref 80–100)
MONOCYTES # BLD AUTO: 0.5 K/UL (ref 0–1)
MONOCYTES NFR BLD AUTO: 4 %
NEUTROPHILS # BLD AUTO: 11.4 K/UL (ref 1.3–7.7)
NEUTROPHILS NFR BLD AUTO: 90 %
PLATELET # BLD AUTO: 227 K/UL (ref 150–450)
POTASSIUM SERPL-SCNC: 4.5 MMOL/L (ref 3.5–5.1)
SODIUM SERPL-SCNC: 142 MMOL/L (ref 137–145)
WBC # BLD AUTO: 12.7 K/UL (ref 3.8–10.6)

## 2019-09-24 RX ADMIN — CLOPIDOGREL BISULFATE SCH MG: 75 TABLET ORAL at 09:06

## 2019-09-24 RX ADMIN — IPRATROPIUM BROMIDE AND ALBUTEROL SULFATE SCH ML: .5; 3 SOLUTION RESPIRATORY (INHALATION) at 19:50

## 2019-09-24 RX ADMIN — ATORVASTATIN CALCIUM SCH MG: 20 TABLET, FILM COATED ORAL at 20:46

## 2019-09-24 RX ADMIN — IPRATROPIUM BROMIDE AND ALBUTEROL SULFATE SCH ML: .5; 3 SOLUTION RESPIRATORY (INHALATION) at 16:02

## 2019-09-24 RX ADMIN — IPRATROPIUM BROMIDE AND ALBUTEROL SULFATE SCH ML: .5; 3 SOLUTION RESPIRATORY (INHALATION) at 08:33

## 2019-09-24 RX ADMIN — LISINOPRIL SCH MG: 20 TABLET ORAL at 09:06

## 2019-09-24 RX ADMIN — FOLIC ACID SCH MG: 1 TABLET ORAL at 09:06

## 2019-09-24 RX ADMIN — ATENOLOL SCH MG: 50 TABLET ORAL at 20:46

## 2019-09-24 RX ADMIN — IPRATROPIUM BROMIDE AND ALBUTEROL SULFATE SCH ML: .5; 3 SOLUTION RESPIRATORY (INHALATION) at 11:39

## 2019-09-24 RX ADMIN — Medication SCH UNIT: at 09:06

## 2019-09-24 RX ADMIN — MIRTAZAPINE SCH MG: 15 TABLET, FILM COATED ORAL at 20:46

## 2019-09-24 RX ADMIN — ENOXAPARIN SODIUM SCH MG: 30 INJECTION SUBCUTANEOUS at 09:06

## 2019-09-24 RX ADMIN — Medication SCH MG: at 20:46

## 2019-09-24 RX ADMIN — LISINOPRIL SCH MG: 20 TABLET ORAL at 20:46

## 2019-09-24 RX ADMIN — LORATADINE SCH MG: 10 TABLET ORAL at 09:06

## 2019-09-24 RX ADMIN — ATENOLOL SCH MG: 50 TABLET ORAL at 09:06

## 2019-09-24 NOTE — P.PN
Subjective


Progress Note Date: 09/23/19


Principal diagnosis: 





Acute COPD exacerbation


Tracheobronchitis.  Failed outpatient therapy





Patient is a 85-year-old male with a known history of coronary artery disease 

with history of stent placement, history of CABG several years ago, chronic CHF 

with ejection fraction unknown, history of AICD placement, hypertension, 

hyperlipidemia and dementia was brought to the hospital from senior living 

facility due to complaints of shortness of breath and cough.  Patient was also 

to have low blood pressure at that facility.  Patient has been having 

significant shortness of breath and cough.  Past 1 week.  Patient was having 

subjective fevers which is unsure.  Patient was started on azithromycin and 

steroid prednisone but without much improvement.  Patient was also having leg sw

elling but it is not worsening recently.


Patient does not have a history of COPD or history of smoking in the past.


No fever admission.  No complaints of chest pain.  No nausea vomiting or 

abdominal pain or diarrhea.  No headache or dizziness or lightheadedness.





X-ray showed COPD and mild cardiomegaly.


EKG showed ventricular pacer rhythm.





Patient was given DuoNeb's breathing treatments and Solu-Medrol and IV fluid 

bolus in the ER.





BNP 2060 09/22/2019


Patient is currently sitting in the bed comfortably and is tolerating oral diet.

 Breathing status is much improved now able to walk to the bathroom but still 

dyspneic compared to normal.  Patient is being converted on IV steroids and 

breathing treatments.  Antibiotics in the form of Levaquin.


Continue with the oxygen therapy and gradually titrate down.  Anticipate 

discharge in next 24-48 hours.





09/23/2019


Patient is currently sitting in a chair comfortably.  Patient is still having 

wheezing on bilateral lungs.  Renal function worsened with creatinine level I.91

today.


Patient is being continued on IV steroids which will be changed to by mouth.  

Continue with antibiotics in the form of Levaquin.


Patient is still requiring oxygen therapy.  Follow up renal function tomorrow.  

Encourage ambulation.


Otherwise patient mentation is slightly confused and hallucinating today.





Medications reviewed.





Objective





- Vital Signs


Vital signs: 


                                   Vital Signs











Temp  98.0 F   09/23/19 19:52


 


Pulse  76   09/23/19 21:21


 


Resp  17   09/23/19 19:52


 


BP  180/84   09/23/19 20:36


 


Pulse Ox  91 L  09/23/19 19:52








                                 Intake & Output











 09/23/19 09/23/19 09/24/19





 06:59 18:59 06:59


 


Intake Total 1256 536 


 


Output Total 200  


 


Balance 1056 536 


 


Intake:   


 


  Oral 1256 536 


 


Output:   


 


  Urine 200  


 


Other:   


 


  Voiding Method Incontinent Incontinent 


 


  # Voids 2  














- Exam





PHYSICAL EXAMINATION: 


Patient is lying in the bed comfortably, no acute distress, awake alert and 

oriented.. 


HEENT: Normocephalic. Neck is supple. Pupils reactive. Nostrils clear. Oral 

cavity is moist. Ears reveal no drainage. 


Neck reveals no JVD, carotid bruits, or thyromegaly. 


CHEST EXAMINATION: Trachea is central. Symmetrical expansion.  Expiratory wheeze

present.  Scattered rhonchi.  Otherwise Lung fields clear to auscultation and 

percussion. 


CARDIAC: Normal S1, S2 with no gallops. No murmurs 


ABDOMEN: Soft. Bowel sounds normal. No organomegaly. No abdominal bruits. 


Extremities: reveal no edema.  No clubbing or cyanosis


Neurologically awake, alert, oriented x3 with well-coordinated movements.  No 

focal deficits noted


Skin: No rash or skin lesions. 


Psychiatric: Coperative.  Nonsuicidal


Musculoskeletal: No joint swelling or deformity.  Normal range of motion.








- Labs


CBC & Chem 7: 


                                 09/24/19 07:01





                                 09/24/19 07:01


Labs: 


                  Abnormal Lab Results - Last 24 Hours (Table)











  09/23/19 09/23/19 Range/Units





  07:18 07:18 


 


WBC  16.8 H   (3.8-10.6)  k/uL


 


RBC  3.64 L   (4.30-5.90)  m/uL


 


Hgb  11.4 L   (13.0-17.5)  gm/dL


 


Hct  34.6 L   (39.0-53.0)  %


 


RDW  16.5 H   (11.5-15.5)  %


 


Neutrophils #  15.2 H   (1.3-7.7)  k/uL


 


Lymphocytes #  0.7 L   (1.0-4.8)  k/uL


 


BUN   46 H  (9-20)  mg/dL


 


Creatinine   1.91 H  (0.66-1.25)  mg/dL


 


Glucose   141 H  (74-99)  mg/dL








                      Microbiology - Last 24 Hours (Table)











 09/21/19 12:15 Blood Culture - Preliminary





 Blood    No Growth after 48 hours














Assessment and Plan


Assessment: 





Acute tracheobronchitis with bronchospasm.  Possible underlying COPD with exace

rbation.  Failed outpatient therapy.


Acute hypoxic respiratory failure secondary to above.


Is [place].  Possible delirium likely due to steroid-induced.


History of coronary artery disease status post CABG


History of AICD placement


History of CVA/TIA.


Hypertension


Dementia/memory impairment


Chronic CHF with ejection fraction unknown.


 DVT prophylaxis with Lovenox.





Plan:


Pt to be continued on IV Solu-Medrol and duo nebs.  Changed to by mouth.  Oxygen

therapy.  Will hold IV fluids.  With antibiotics in the form of Levaquin.


Chest x-ray showed no evidence of CHF.  BNP is not significantly elevated.  Will

continue the blood pressure medications and adjust dose.


Further recommendations based on the clinical course.  Prognosis is guarded.


Discussed with his son at bedside in detail.








Time with Patient: Greater than 30

## 2019-09-24 NOTE — P.PN
Subjective


Progress Note Date: 09/24/19


Principal diagnosis: 





Acute COPD exacerbation


Tracheobronchitis.  Failed outpatient therapy





Patient is a 85-year-old male with a known history of coronary artery disease 

with history of stent placement, history of CABG several years ago, chronic CHF 

with ejection fraction unknown, history of AICD placement, hypertension, 

hyperlipidemia and dementia was brought to the hospital from senior living 

facility due to complaints of shortness of breath and cough.  Patient was also 

to have low blood pressure at that facility.  Patient has been having 

significant shortness of breath and cough.  Past 1 week.  Patient was having 

subjective fevers which is unsure.  Patient was started on azithromycin and 

steroid prednisone but without much improvement.  Patient was also having leg sw

elling but it is not worsening recently.


Patient does not have a history of COPD or history of smoking in the past.


No fever admission.  No complaints of chest pain.  No nausea vomiting or 

abdominal pain or diarrhea.  No headache or dizziness or lightheadedness.





X-ray showed COPD and mild cardiomegaly.


EKG showed ventricular pacer rhythm.





Patient was given DuoNeb's breathing treatments and Solu-Medrol and IV fluid 

bolus in the ER.





BNP 2060 09/22/2019


Patient is currently sitting in the bed comfortably and is tolerating oral diet.

 Breathing status is much improved now able to walk to the bathroom but still 

dyspneic compared to normal.  Patient is being converted on IV steroids and 

breathing treatments.  Antibiotics in the form of Levaquin.


Continue with the oxygen therapy and gradually titrate down.  Anticipate 

discharge in next 24-48 hours.





09/23/2019


Patient is currently sitting in a chair comfortably.  Patient is still having 

wheezing on bilateral lungs.  Renal function worsened with creatinine level I.91

today.


Patient is being continued on IV steroids which will be changed to by mouth.  

Continue with antibiotics in the form of Levaquin.


Patient is still requiring oxygen therapy.  Follow up renal function tomorrow.  

Encourage ambulation.


Otherwise patient mentation is slightly confused and hallucinating today.





09/24/2019


Patient is currently sitting in a chair comfortably.  Mental status slightly 

improved compared to yesterday.  Patient still having expiratory wheeze.


Currently being continued on prednisone and DuoNeb's and antibiotics.  No cough 

or sputum production.


Patient is still requiring oxygen with another cannula at 2 L.  Titrating down 

to 2 minutes.


[place] increased activity and oral intake.  Anticipate to be discharged to 

rehab in next 24 hours.





Medications reviewed.





Objective





- Vital Signs


Vital signs: 


                                   Vital Signs











Temp  97.9 F   09/24/19 20:00


 


Pulse  76   09/24/19 20:02


 


Resp  20   09/24/19 20:00


 


BP  147/67   09/24/19 20:00


 


Pulse Ox  95   09/24/19 20:00








                                 Intake & Output











 09/24/19 09/24/19 09/25/19





 06:59 18:59 06:59


 


Intake Total 100  250


 


Output Total  100 


 


Balance 100 -100 250


 


Intake:   


 


  Oral 100  250


 


Output:   


 


  Urine  100 


 


Other:   


 


  Voiding Method Incontinent Incontinent Incontinent


 


  # Voids 1 1 2














- Exam





PHYSICAL EXAMINATION: 


Patient is lying in the bed comfortably, no acute distress, awake alert and 

oriented.. 


HEENT: Normocephalic. Neck is supple. Pupils reactive. Nostrils clear. Oral c

avity is moist. Ears reveal no drainage. 


Neck reveals no JVD, carotid bruits, or thyromegaly. 


CHEST EXAMINATION: Trachea is central. Symmetrical expansion.  Expiratory wheeze

present.  Scattered rhonchi.  Otherwise Lung fields clear to auscultation and 

percussion. 


CARDIAC: Normal S1, S2 with no gallops. No murmurs 


ABDOMEN: Soft. Bowel sounds normal. No organomegaly. No abdominal bruits. 


Extremities: reveal no edema.  No clubbing or cyanosis


Neurologically awake, alert, oriented x3 with well-coordinated movements.  No 

focal deficits noted


Skin: No rash or skin lesions. 


Psychiatric: Coperative.  Nonsuicidal


Musculoskeletal: No joint swelling or deformity.  Normal range of motion.








- Labs


CBC & Chem 7: 


                                 09/24/19 07:01





                                 09/24/19 07:01


Labs: 


                  Abnormal Lab Results - Last 24 Hours (Table)











  09/24/19 09/24/19 Range/Units





  07:01 07:01 


 


WBC  12.7 H   (3.8-10.6)  k/uL


 


RBC  3.75 L   (4.30-5.90)  m/uL


 


Hgb  11.5 L   (13.0-17.5)  gm/dL


 


Hct  35.9 L   (39.0-53.0)  %


 


Neutrophils #  11.4 H   (1.3-7.7)  k/uL


 


Lymphocytes #  0.5 L   (1.0-4.8)  k/uL


 


BUN   56 H  (9-20)  mg/dL


 


Creatinine   1.79 H  (0.66-1.25)  mg/dL


 


Glucose   148 H  (74-99)  mg/dL








                      Microbiology - Last 24 Hours (Table)











 09/21/19 12:15 Blood Culture - Preliminary





 Blood    No Growth after 72 hours














Assessment and Plan


Assessment: 





Acute tracheobronchitis with bronchospasm.  Possible underlying COPD with 

exacerbation.  Failed outpatient therapy.


Acute hypoxic respiratory failure secondary to above.


Hallucinations.  Possible delirium likely due to steroid-induced.  Improved now.


History of coronary artery disease status post CABG


History of AICD placement


History of CVA/TIA.


Hypertension


Dementia/memory impairment


Chronic CHF with ejection fraction unknown.


 DVT prophylaxis with Lovenox.





Plan:


Pt to be continued on IV Solu-Medrol and duo nebs.  Changed to by mouth.  Oxygen

therapy.  Will hold IV fluids.  With antibiotics in the form of Levaquin.


Chest x-ray showed no evidence of CHF.  BNP is not significantly elevated.  Will

continue the blood pressure medications and adjust dose.


Further recommendations based on the clinical course.  Prognosis is guarded.


Discussed with his wife at bedside in detail.








Time with Patient: Greater than 30

## 2019-09-25 VITALS — RESPIRATION RATE: 18 BRPM | DIASTOLIC BLOOD PRESSURE: 76 MMHG | TEMPERATURE: 97 F | SYSTOLIC BLOOD PRESSURE: 164 MMHG

## 2019-09-25 VITALS — HEART RATE: 70 BPM

## 2019-09-25 RX ADMIN — IPRATROPIUM BROMIDE AND ALBUTEROL SULFATE SCH ML: .5; 3 SOLUTION RESPIRATORY (INHALATION) at 11:34

## 2019-09-25 RX ADMIN — LORATADINE SCH MG: 10 TABLET ORAL at 08:36

## 2019-09-25 RX ADMIN — Medication SCH UNIT: at 08:36

## 2019-09-25 RX ADMIN — ENOXAPARIN SODIUM SCH MG: 30 INJECTION SUBCUTANEOUS at 08:34

## 2019-09-25 RX ADMIN — ATENOLOL SCH MG: 50 TABLET ORAL at 08:36

## 2019-09-25 RX ADMIN — IPRATROPIUM BROMIDE AND ALBUTEROL SULFATE SCH ML: .5; 3 SOLUTION RESPIRATORY (INHALATION) at 08:43

## 2019-09-25 RX ADMIN — CLOPIDOGREL BISULFATE SCH MG: 75 TABLET ORAL at 08:37

## 2019-09-25 RX ADMIN — FOLIC ACID SCH MG: 1 TABLET ORAL at 08:35

## 2019-09-25 RX ADMIN — LISINOPRIL SCH MG: 20 TABLET ORAL at 08:36

## 2019-11-24 ENCOUNTER — HOSPITAL ENCOUNTER (INPATIENT)
Dept: HOSPITAL 47 - EC | Age: 84
LOS: 3 days | Discharge: HOME HEALTH | DRG: 291 | End: 2019-11-27
Attending: HOSPITALIST | Admitting: HOSPITALIST
Payer: MEDICARE

## 2019-11-24 VITALS — BODY MASS INDEX: 31.3 KG/M2

## 2019-11-24 DIAGNOSIS — Z79.899: ICD-10-CM

## 2019-11-24 DIAGNOSIS — I25.10: ICD-10-CM

## 2019-11-24 DIAGNOSIS — Z66: ICD-10-CM

## 2019-11-24 DIAGNOSIS — F05: ICD-10-CM

## 2019-11-24 DIAGNOSIS — Z88.0: ICD-10-CM

## 2019-11-24 DIAGNOSIS — D72.829: ICD-10-CM

## 2019-11-24 DIAGNOSIS — Z95.1: ICD-10-CM

## 2019-11-24 DIAGNOSIS — T50.2X5A: ICD-10-CM

## 2019-11-24 DIAGNOSIS — Z86.73: ICD-10-CM

## 2019-11-24 DIAGNOSIS — G93.41: ICD-10-CM

## 2019-11-24 DIAGNOSIS — E86.0: ICD-10-CM

## 2019-11-24 DIAGNOSIS — N18.3: ICD-10-CM

## 2019-11-24 DIAGNOSIS — K64.9: ICD-10-CM

## 2019-11-24 DIAGNOSIS — Z95.5: ICD-10-CM

## 2019-11-24 DIAGNOSIS — D64.9: ICD-10-CM

## 2019-11-24 DIAGNOSIS — F03.90: ICD-10-CM

## 2019-11-24 DIAGNOSIS — I13.0: Primary | ICD-10-CM

## 2019-11-24 DIAGNOSIS — Z79.02: ICD-10-CM

## 2019-11-24 DIAGNOSIS — N17.9: ICD-10-CM

## 2019-11-24 DIAGNOSIS — E78.5: ICD-10-CM

## 2019-11-24 DIAGNOSIS — R09.02: ICD-10-CM

## 2019-11-24 DIAGNOSIS — J44.1: ICD-10-CM

## 2019-11-24 DIAGNOSIS — Z51.5: ICD-10-CM

## 2019-11-24 DIAGNOSIS — Z95.0: ICD-10-CM

## 2019-11-24 DIAGNOSIS — I50.23: ICD-10-CM

## 2019-11-24 LAB
ALBUMIN SERPL-MCNC: 3.8 G/DL (ref 3.5–5)
ALP SERPL-CCNC: 116 U/L (ref 38–126)
ALT SERPL-CCNC: 41 U/L (ref 21–72)
ANION GAP SERPL CALC-SCNC: 11 MMOL/L
ANION GAP SERPL CALC-SCNC: 12 MMOL/L
APTT BLD: 27.1 SEC (ref 22–30)
AST SERPL-CCNC: 25 U/L (ref 17–59)
BASOPHILS # BLD AUTO: 0 K/UL (ref 0–0.2)
BASOPHILS # BLD AUTO: 0.1 K/UL (ref 0–0.2)
BASOPHILS NFR BLD AUTO: 0 %
BASOPHILS NFR BLD AUTO: 1 %
BUN SERPL-SCNC: 41 MG/DL (ref 9–20)
BUN SERPL-SCNC: 41 MG/DL (ref 9–20)
CALCIUM SPEC-MCNC: 9.3 MG/DL (ref 8.4–10.2)
CALCIUM SPEC-MCNC: 9.5 MG/DL (ref 8.4–10.2)
CHLORIDE SERPL-SCNC: 109 MMOL/L (ref 98–107)
CHLORIDE SERPL-SCNC: 111 MMOL/L (ref 98–107)
CK SERPL-CCNC: 33 U/L (ref 55–170)
CO2 SERPL-SCNC: 24 MMOL/L (ref 22–30)
CO2 SERPL-SCNC: 25 MMOL/L (ref 22–30)
EOSINOPHIL # BLD AUTO: 0 K/UL (ref 0–0.7)
EOSINOPHIL # BLD AUTO: 0.1 K/UL (ref 0–0.7)
EOSINOPHIL NFR BLD AUTO: 0 %
EOSINOPHIL NFR BLD AUTO: 2 %
ERYTHROCYTE [DISTWIDTH] IN BLOOD BY AUTOMATED COUNT: 3.42 M/UL (ref 4.3–5.9)
ERYTHROCYTE [DISTWIDTH] IN BLOOD BY AUTOMATED COUNT: 3.47 M/UL (ref 4.3–5.9)
ERYTHROCYTE [DISTWIDTH] IN BLOOD BY AUTOMATED COUNT: 3.47 M/UL (ref 4.3–5.9)
ERYTHROCYTE [DISTWIDTH] IN BLOOD: 15.6 % (ref 11.5–15.5)
ERYTHROCYTE [DISTWIDTH] IN BLOOD: 15.7 % (ref 11.5–15.5)
ERYTHROCYTE [DISTWIDTH] IN BLOOD: 15.8 % (ref 11.5–15.5)
GLUCOSE BLD-MCNC: 149 MG/DL (ref 75–99)
GLUCOSE SERPL-MCNC: 155 MG/DL (ref 74–99)
GLUCOSE SERPL-MCNC: 99 MG/DL (ref 74–99)
HCT VFR BLD AUTO: 33.9 % (ref 39–53)
HCT VFR BLD AUTO: 34.3 % (ref 39–53)
HCT VFR BLD AUTO: 34.5 % (ref 39–53)
HGB BLD-MCNC: 11.1 GM/DL (ref 13–17.5)
INR PPP: 1.1 (ref ?–1.2)
LYMPHOCYTES # SPEC AUTO: 0.5 K/UL (ref 1–4.8)
LYMPHOCYTES # SPEC AUTO: 1.9 K/UL (ref 1–4.8)
LYMPHOCYTES NFR SPEC AUTO: 22 %
LYMPHOCYTES NFR SPEC AUTO: 7 %
MCH RBC QN AUTO: 32 PG (ref 25–35)
MCH RBC QN AUTO: 32 PG (ref 25–35)
MCH RBC QN AUTO: 32.6 PG (ref 25–35)
MCHC RBC AUTO-ENTMCNC: 32.2 G/DL (ref 31–37)
MCHC RBC AUTO-ENTMCNC: 32.4 G/DL (ref 31–37)
MCHC RBC AUTO-ENTMCNC: 32.9 G/DL (ref 31–37)
MCV RBC AUTO: 98.7 FL (ref 80–100)
MCV RBC AUTO: 99.2 FL (ref 80–100)
MCV RBC AUTO: 99.4 FL (ref 80–100)
MONOCYTES # BLD AUTO: 0.1 K/UL (ref 0–1)
MONOCYTES # BLD AUTO: 0.6 K/UL (ref 0–1)
MONOCYTES NFR BLD AUTO: 2 %
MONOCYTES NFR BLD AUTO: 7 %
NEUTROPHILS # BLD AUTO: 5.9 K/UL (ref 1.3–7.7)
NEUTROPHILS # BLD AUTO: 6.9 K/UL (ref 1.3–7.7)
NEUTROPHILS NFR BLD AUTO: 67 %
NEUTROPHILS NFR BLD AUTO: 90 %
PLATELET # BLD AUTO: 112 K/UL (ref 150–450)
PLATELET # BLD AUTO: 118 K/UL (ref 150–450)
PLATELET # BLD AUTO: 122 K/UL (ref 150–450)
POTASSIUM SERPL-SCNC: 4.4 MMOL/L (ref 3.5–5.1)
POTASSIUM SERPL-SCNC: 4.7 MMOL/L (ref 3.5–5.1)
PROT SERPL-MCNC: 8 G/DL (ref 6.3–8.2)
PT BLD: 11.2 SEC (ref 9–12)
SODIUM SERPL-SCNC: 146 MMOL/L (ref 137–145)
SODIUM SERPL-SCNC: 146 MMOL/L (ref 137–145)
WBC # BLD AUTO: 7.2 K/UL (ref 3.8–10.6)
WBC # BLD AUTO: 7.7 K/UL (ref 3.8–10.6)
WBC # BLD AUTO: 8.9 K/UL (ref 3.8–10.6)

## 2019-11-24 PROCEDURE — 70496 CT ANGIOGRAPHY HEAD: CPT

## 2019-11-24 PROCEDURE — 80053 COMPREHEN METABOLIC PANEL: CPT

## 2019-11-24 PROCEDURE — 85610 PROTHROMBIN TIME: CPT

## 2019-11-24 PROCEDURE — 82550 ASSAY OF CK (CPK): CPT

## 2019-11-24 PROCEDURE — 87040 BLOOD CULTURE FOR BACTERIA: CPT

## 2019-11-24 PROCEDURE — 71045 X-RAY EXAM CHEST 1 VIEW: CPT

## 2019-11-24 PROCEDURE — 96374 THER/PROPH/DIAG INJ IV PUSH: CPT

## 2019-11-24 PROCEDURE — 85027 COMPLETE CBC AUTOMATED: CPT

## 2019-11-24 PROCEDURE — 71046 X-RAY EXAM CHEST 2 VIEWS: CPT

## 2019-11-24 PROCEDURE — 94640 AIRWAY INHALATION TREATMENT: CPT

## 2019-11-24 PROCEDURE — 84443 ASSAY THYROID STIM HORMONE: CPT

## 2019-11-24 PROCEDURE — 85730 THROMBOPLASTIN TIME PARTIAL: CPT

## 2019-11-24 PROCEDURE — 94760 N-INVAS EAR/PLS OXIMETRY 1: CPT

## 2019-11-24 PROCEDURE — 82533 TOTAL CORTISOL: CPT

## 2019-11-24 PROCEDURE — 70450 CT HEAD/BRAIN W/O DYE: CPT

## 2019-11-24 PROCEDURE — 84484 ASSAY OF TROPONIN QUANT: CPT

## 2019-11-24 PROCEDURE — 93005 ELECTROCARDIOGRAM TRACING: CPT

## 2019-11-24 PROCEDURE — 36415 COLL VENOUS BLD VENIPUNCTURE: CPT

## 2019-11-24 PROCEDURE — 99285 EMERGENCY DEPT VISIT HI MDM: CPT

## 2019-11-24 PROCEDURE — 80048 BASIC METABOLIC PNL TOTAL CA: CPT

## 2019-11-24 PROCEDURE — 83880 ASSAY OF NATRIURETIC PEPTIDE: CPT

## 2019-11-24 PROCEDURE — 85025 COMPLETE CBC W/AUTO DIFF WBC: CPT

## 2019-11-24 PROCEDURE — 93306 TTE W/DOPPLER COMPLETE: CPT

## 2019-11-24 PROCEDURE — 83605 ASSAY OF LACTIC ACID: CPT

## 2019-11-24 RX ADMIN — Medication SCH MG: at 19:55

## 2019-11-24 RX ADMIN — PANTOPRAZOLE SODIUM SCH MG: 40 INJECTION, POWDER, FOR SOLUTION INTRAVENOUS at 20:01

## 2019-11-24 RX ADMIN — IPRATROPIUM BROMIDE AND ALBUTEROL SULFATE SCH ML: .5; 3 SOLUTION RESPIRATORY (INHALATION) at 16:37

## 2019-11-24 RX ADMIN — IPRATROPIUM BROMIDE AND ALBUTEROL SULFATE SCH ML: .5; 3 SOLUTION RESPIRATORY (INHALATION) at 12:17

## 2019-11-24 RX ADMIN — MIRTAZAPINE SCH MG: 15 TABLET, FILM COATED ORAL at 19:54

## 2019-11-24 RX ADMIN — ATENOLOL SCH MG: 50 TABLET ORAL at 19:55

## 2019-11-24 RX ADMIN — IPRATROPIUM BROMIDE AND ALBUTEROL SULFATE SCH ML: .5; 3 SOLUTION RESPIRATORY (INHALATION) at 20:32

## 2019-11-24 RX ADMIN — FORMOTEROL FUMARATE DIHYDRATE SCH MCG: 20 SOLUTION RESPIRATORY (INHALATION) at 20:32

## 2019-11-24 RX ADMIN — ATORVASTATIN CALCIUM SCH MG: 20 TABLET, FILM COATED ORAL at 19:55

## 2019-11-24 RX ADMIN — PANTOPRAZOLE SODIUM SCH MG: 40 INJECTION, POWDER, FOR SOLUTION INTRAVENOUS at 17:11

## 2019-11-24 NOTE — P.HPIM
History of Present Illness


H&P Date: 11/24/19


Chief Complaint: Shortness of breath, bright red blood per rectum





86-year-old male with extensive past medical history including dementia, COPD, 

congestive heart failure, CAD post CABG and stent placement with permanent 

pacemaker and defibrillator, hypertension, dyslipidemia, history of CVA presents

the ED from [facility] [place]  Majority of history is obtained from the 

daughter and ED note as patient is unable to answer questions effectively.  

Daughter reports worsening shortness of breath over the past month, recently 

worsening over the past week.  Daughter reports that her father has advanced 

dementia but is usually able to communicate effectively.  The daughter Larissa is 

the power of .  She presents paperwork that states that the patient is 

DNR/DNI.  I did receive a call from the RN soon as the patient came to the 

telemetry floor that they noticed bright red blood per rectum when attempting to

turn the patient over.  Patient is currently bradycardic with heart rate in the 

40s and 50s.  He is saturating mid 90s on 2 L nasal cannula.  Blood pressure is 

otherwise stable.  In the ED, CBC showed hemoglobin of 11.1 and platelet count 

of 122.  Coagulation panel was negative.  CMP showed sodium of 146, chloride 

111, BUN 41, creatinine 1.67.  BNP was 1990, chest x-ray showing cardiomegaly 

with increasing interstitial changes.  Troponin was 0.021, EKG showing low 

voltage QRS and atrial pacemaker.  Patient is admitted for COPD and CHF 

exacerbation with cardiology on consultation.  Patient was made nothing by 

mouth, started on Protonix IV and GI consulted for rectal bleed.





Review of Systems





Pertinent positives and negatives as discussed in HPI, a complete review of 

systems was performed and all other systems are negative.





Past Medical History


Past Medical History: Cancer, Chest Pain / Angina, Heart Failure, CVA/TIA, 

Hyperlipidemia, Hypertension, Memory Impairment


History of Any Multi-Drug Resistant Organisms: None Reported


Past Surgical History: Heart Catheterization With Stent, Pacemaker


Additional Past Surgical History / Comment(s): Open Heart surgery


Past Anesthesia/Blood Transfusion Reactions: No Reported Reaction


Date of Last Stent Placement:: 2015


Type of Cardiac Device: Permanent Pacemaker


Device Placement Date:: 2015


Past Psychological History: No Psychological Hx Reported


Smoking Status: Never smoker


Past Alcohol Use History: None Reported


Past Drug Use History: None Reported





- Past Family History


  ** Father


Family Medical History: No Reported History





Medications and Allergies


                                Home Medications











 Medication  Instructions  Recorded  Confirmed  Type


 


Acetaminophen [Tylenol] 500 - 1,000 mg PO BID PRN 09/21/19 11/24/19 History


 


Albuterol Inhaler [Ventolin Hfa 1 - 2 puff INHALATION RT-Q4H PRN 09/21/19 11/24/19 History





Inhaler]    


 


Atenolol [Tenormin] 50 mg PO BID@0800,2000 09/21/19 11/24/19 History


 


Atorvastatin [Lipitor] 20 mg PO HS@2000 09/21/19 11/24/19 History


 


Cholecalciferol (Vitamin D3) 2,000 unit PO DAILY@0800 09/21/19 11/24/19 History





[Vitamin D3]    


 


Clopidogrel Bisulfate [Plavix] 75 mg PO DAILY@0800 09/21/19 11/24/19 History


 


Enalapril Maleate [Vasotec] 20 mg PO BID@0800,2000 09/21/19 11/24/19 History


 


Folic Acid 0.4 mg PO DAILY@0800 09/21/19 11/24/19 History


 


Guaifenesin/Dextromethorphan 2.5 - 5 ml PO Q4-6H PRN 09/21/19 11/24/19 History





[guaiFENesin DM]    


 


Loratadine 10 mg PO DAILY@0800 09/21/19 11/24/19 History


 


Melatonin 10 mg PO HS@2000 09/21/19 11/24/19 History


 


Mirtazapine 30 mg PO HS@2000 09/21/19 11/24/19 History


 


Doxazosin [Cardura] 4 mg PO DAILY@0800 11/24/19 11/24/19 History


 


Ipratropium-Albuterol Nebulize 3 ml INHALATION RT-DAILY PRN 11/24/19 11/24/19 

History





[Duoneb 0.5 mg-3 mg/3 ml Soln]    


 


Ipratropium-Albuterol Nebulize 3 ml INHALATION RT-QID 11/24/19 11/24/19 History





[Duoneb 0.5 mg-3 mg/3 ml Soln]    


 


amLODIPine [Norvasc] 10 mg PO DAILY@0700 11/24/19 11/24/19 History








                                    Allergies











Allergy/AdvReac Type Severity Reaction Status Date / Time


 


Penicillins Allergy  Unknown Verified 11/24/19 09:50














Physical Exam


Vitals: 


                                   Vital Signs











  Temp Pulse Resp BP Pulse Ox


 


 11/24/19 12:23   52 L   


 


 11/24/19 12:18   52 L   


 


 11/24/19 10:17   51 L  18  122/66  93 L


 


 11/24/19 09:43   48 L   


 


 11/24/19 09:31   49 L   


 


 11/24/19 09:14   49 L  18  137/64  93 L


 


 11/24/19 08:02  97.9 F  54 L  18  141/79  91 L








                                Intake and Output











 11/23/19 11/24/19 11/24/19





 22:59 06:59 14:59


 


Other:   


 


  Weight   90.718 kg














General: [non toxic], [no distress on 2 L nasal cannula unable to effectively 

communicate], [appears at stated age]


Derm: [warm], [dry]


Head: [atraumatic], [normocephalic], [symmetric]


Eyes: [EOMI], [no lid lag], [anicteric sclera]


Mouth: [no lip lesion], [mucus membranes moist]


Cardiovascular: [S1S2 reg], [bradycardia], [positive DP pulse bilateral], 


Lungs: [Diffuse expiratory wheezing bilaterally], [no rhonchi, no rales] , [no a

ccessory muscle use]


Abdominal: [soft], [ nontender to palpation], [no guarding], [no appreciable or

ganomegaly]


Ext: [no gross muscle atrophy], [1+ pitting lower extremity bilateral edema], 

[no contractures]


Neuro: [Unable to determine]


Psych: [Unable to determine] 





Results


CBC & Chem 7: 


                                 11/24/19 12:34





                                 11/24/19 08:00


Labs: 


                  Abnormal Lab Results - Last 24 Hours (Table)











  11/24/19 11/24/19 Range/Units





  08:00 08:00 


 


RBC  3.42 L   (4.30-5.90)  m/uL


 


Hgb  11.1 L   (13.0-17.5)  gm/dL


 


Hct  33.9 L   (39.0-53.0)  %


 


RDW  15.7 H   (11.5-15.5)  %


 


Plt Count  122 L   (150-450)  k/uL


 


Sodium   146 H  (137-145)  mmol/L


 


Chloride   111 H  ()  mmol/L


 


BUN   41 H  (9-20)  mg/dL


 


Creatinine   1.67 H  (0.66-1.25)  mg/dL














Assessment and Plan


Assessment: 





Assessment and plan





Acute on chronic CHF exacerbation


COPD exacerbation


Bright red blood per rectum


Hypothermia


Acute kidney injury


CAD post CABG and stent placement with permanent pacemaker and defibrillator, 

dyslipidemia and history of CVA


Hypertension





BNP 1990 with chest x-ray showing vascular congestion. Plans: Start diuresis 

with Lasix 40 mg IV daily.  Strict intake and output.  Daily weights.  Follow-up

echocardiogram.  Follow-up cardiology consultation.  Trend troponin/EKG to rule 

out ACS.





Patient with diffuse wheezing on physical exam.  Plans: DuoNeb scheduled and as 

needed for shortness of breath and wheezing.  Start prednisone 40 mg by mouth 

daily.  O2 per NC to maintain O2 saturation greater than 92%.  Add formoterol 

neb twice a day.





Hemoglobin 11.1.  Bright red blood per rectum seen by RN.  Plans: CBC every 6 

hours.  Start Protonix 40 mg IV twice a day.  Nothing by mouth.  Follow GI 

consultation.





Rectal temperature per RN was 92.  No clear signs of infection or sepsis at 

this time.  Patient is DNR/DNI.  Plans: [facility].  Follow blood cultures.





Creatinine 1.67.  Likely due to dehydration.  Possible component of CKD.  Plans:

Avoid nephrotoxins.  Avoid IVF due to fluid overload state.  Repeat BMP tomorrow

morning.





Plans: Hold aspirin and Plavix.  Resume beta blocker.





/66. Plans: Restart atenolol and amlodipine.  Monitor vitals adjust 

medications as necessary.





DVT prophylaxis: [SCD]


Discussed with: [Daughter]


Anticipated discharge: [2-3 days]


Anticipated discharge place: [[facility]]


A total of [45] minutes was spent on the care of this complex patient more than 

50% of the time was spent in counseling and care coordination.





Patient's daughter Larissa is the power of .  She presents paperwork for 

DNR/DNI of this patient.  Prognosis is extremely guarded at this time.  Patient 

is admitted for anticipated greater than 48 hour admission for CHF/COPD 

exacerbation and new onset bright red blood per rectum.

## 2019-11-24 NOTE — CT
CT angiogram of the brain

 

HISTORY: Altered mental status

 

Helical acquisition through the brain during dynamic administration 65 cc Isovue 370. Three-dimension
al reconstructions performed on an alternate workstation. Automated exposure control for dose reducti
on, .7 mGycm

 

Correlation CT brain same date

 

Extensive cerebral vascular calcifications are present. There is no evident hemorrhage or aneurysm. N
o embolus or dissection.

 

IMPRESSION: Cerebral vascular disease.

## 2019-11-24 NOTE — CT
EXAMINATION TYPE: CT brain wo con

 

DATE OF EXAM: 11/24/2019

 

COMPARISON: Prior CT unavailable, correlation CT angiogram same date.

 

HISTORY: Mental status changes.

 

CT DLP: 1486 mGycm

Automated exposure control for dose reduction was used. Helical acquisition through the brain.

 

FINDINGS: 

There are dense cerebral vascular calcifications present. Cortical atrophy is present. There is encep
halomalacia present involving the basal ganglia on the right, internal capsule with ex vacuo phenomen
on of the frontal horn of the right lateral ventricle. Periventricular white matter shows patchy low 
attenuation. No hemorrhage or hydrocephalus. There is some inflammatory change in the mastoid air nenita
ls on the right. Orbits show symmetric appearance.

 

IMPRESSION: 

CHRONIC CEREBROVASCULAR ACCIDENT, AGE-RELATED CHANGES OF ATROPHY AND CHRONIC SMALL VESSEL ISCHEMIA

## 2019-11-24 NOTE — ED
SOB HPI





- General


Chief Complaint: Shortness of Breath


Stated Complaint: ASHLEY


Time Seen by Provider: 11/24/19 07:52


Source: patient, EMS


Mode of arrival: EMS


Limitations: no limitations





- History of Present Illness


Initial Comments: 


86 year old male history of dementia, COPD, congestive heart failure with what 

appears to be a demand pacemaker ([employer]) , CAD presenting to the ER today for

cc of worsening SOB. Patient family told EMS that patient has had increasing SOB

x 1 month that appears to be worsenign for the past week> Patient currently 

states answering questions appropriately that he is short of breath, denies 

chest pain, nausea, epigastric or jaw pain. Denies back pain or arm pain. 

patient denies any other complaints. Patient given solumedrol 125mg and 2 duoneb

treatments during transit to emergency department, patient recently treated for 

an upper respiratory infection. Patient denies cough. Upon arrival patient 

appears nontoxic no distress, slight increase in worse of breathing.








- Related Data


                                Home Medications











 Medication  Instructions  Recorded  Confirmed


 


Acetaminophen [Tylenol] 500 - 1,000 mg PO BID PRN 09/21/19 09/21/19


 


Albuterol Inhaler [Ventolin Hfa 1 - 2 puff INHALATION RT-Q6H PRN 09/21/19 09/21/19





Inhaler]   


 


Atenolol [Tenormin] 50 mg PO BID@0730,2000 09/21/19 09/21/19


 


Atorvastatin [Lipitor] 20 mg PO HS@2000 09/21/19 09/21/19


 


Cholecalciferol (Vitamin D3) 2,000 unit PO DAILY@0730 09/21/19 09/21/19





[Vitamin D3]   


 


Clopidogrel Bisulfate [Plavix] 75 mg PO DAILY@0730 09/21/19 09/21/19


 


Enalapril Maleate [Vasotec] 20 mg PO BID@0800,2000 09/21/19 09/21/19


 


Folic Acid 0.4 mg PO DAILY@0730 09/21/19 09/21/19


 


Guaifenesin/Dextromethorphan 5 ml PO Q46H PRN 09/21/19 09/21/19





[guaiFENesin DM]   


 


Loratadine 10 mg PO DAILY@0800 09/21/19 09/21/19


 


Melatonin 10 mg PO HS@2000 09/21/19 09/21/19


 


Mirtazapine 30 mg PO HS@2000 09/21/19 09/21/19


 


amLODIPine [Norvasc] 5 mg PO BID@0730,2000 09/21/19 09/21/19








                                  Previous Rx's











 Medication  Instructions  Recorded


 


Levofloxacin [Levaquin] 750 mg PO Q48H #2 tab 09/24/19


 


predniSONE See Taper PO AS DIRECTED #18 tab 09/24/19











                                    Allergies











Allergy/AdvReac Type Severity Reaction Status Date / Time


 


Penicillins Allergy  Unknown Verified 11/24/19 08:02














Review of Systems


ROS Statement: 


Those systems with pertinent positive or pertinent negative responses have been 

documented in the HPI.





ROS Other: All systems not noted in ROS Statement are negative.





Past Medical History


Past Medical History: Cancer, Chest Pain / Angina, Heart Failure, CVA/TIA, 

Hyperlipidemia, Hypertension, Memory Impairment


History of Any Multi-Drug Resistant Organisms: None Reported


Past Surgical History: Heart Catheterization With Stent, Pacemaker


Additional Past Surgical History / Comment(s): Open Heart surgery


Past Anesthesia/Blood Transfusion Reactions: No Reported Reaction


Date of Last Stent Placement:: 2015


Type of Cardiac Device: Permanent Pacemaker


Device Placement Date:: 2015


Past Psychological History: No Psychological Hx Reported


Smoking Status: Never smoker


Past Alcohol Use History: None Reported


Past Drug Use History: None Reported





- Past Family History


  ** Father


Family Medical History: No Reported History





General Exam





- General Exam Comments


Initial Comments: 


General:  The patient is awake and alert, in no distress


Eye:  +3 mm pupils are equal, round and reactive to light, extra-ocular 

movements are intact.  No nystagmus.  There is normal conjunctiva bilaterally.  

No signs of icterus.  


Ears, nose, mouth and throat:  There are moist mucous membranes and no oral 

lesions. 


Neck:  The neck is supple, there is no tenderness or JVD.  


Cardiovascular:  There is a regular rate and rhythm. Murmr, no rub or gallop is 

appreciated.


Respiratory:  Respirations are non-labored, breath sounds are equal. Rales noted

 b/l. Slight expiratory wheeze with diminished lung sounds. Mild rhonchi.


Gastrointestinal:  Soft, non-distended, non-tender abdomen without masses or 

organomegaly noted. There is no rebound or guarding present


Musculoskeletal:  Normal ROM, no tenderness.  Strength 5/5. Sensation intact. 

Radial and DP pulses equal bilaterally 2+.  


Neurological:  A&O x 2, not oriented to date. CN II-XII intact grossly, There 

are no obvious motor or sensory deficits. Coordination appears grossly intact. 

Speech is normal.


Skin:  Skin is warm and dry and no rashes or lesions are noted. b/l LE pitting 

edema +1.


Psychiatric:  Cooperative, appropriate mood & affect, normal judgment.  





Limitations: no limitations





Course


                                   Vital Signs











  11/24/19 11/24/19





  08:02 09:14


 


Temperature 97.9 F 


 


Pulse Rate 54 L 49 L


 


Respiratory 18 18





Rate  


 


Blood Pressure 141/79 137/64


 


O2 Sat by Pulse 91 L 93 L





Oximetry  














Medical Decision Making





- Medical Decision Making


86-year-old male history of COPD CHF with pacemaker presents emergency 

department for worsening shortness of breath 1 month acutely 1 week.  Patient 

has rails extremities with diminished lung sounds on physical examination 

appears short of breath however is in no distress.  Patient is slightly hypoxic.

  There is evidence of pleural effusion on chest x-ray pitting edema and 

elevation of BNP consistent with CHF exacerbation. With compunding COPD 

exacerbation. Patient will be admitted for breathing treatments, cardiology 

evaluation with IV lasix. Patient does not appear to have a pneumonia, no cough,

 afebrile, without leukocytosis. Discussed case with Dr. mendoza who is agreeable 

to admission speaking with on admitting providers.








- Lab Data


Result diagrams: 


                                 11/24/19 08:00





                                 11/24/19 08:00


                                   Lab Results











  11/24/19 11/24/19 11/24/19 Range/Units





  08:00 08:00 08:00 


 


WBC  8.9    (3.8-10.6)  k/uL


 


RBC  3.42 L    (4.30-5.90)  m/uL


 


Hgb  11.1 L    (13.0-17.5)  gm/dL


 


Hct  33.9 L    (39.0-53.0)  %


 


MCV  99.2    (80.0-100.0)  fL


 


MCH  32.6    (25.0-35.0)  pg


 


MCHC  32.9    (31.0-37.0)  g/dL


 


RDW  15.7 H    (11.5-15.5)  %


 


Plt Count  122 L    (150-450)  k/uL


 


Neutrophils %  67    %


 


Lymphocytes %  22    %


 


Monocytes %  7    %


 


Eosinophils %  2    %


 


Basophils %  0    %


 


Neutrophils #  5.9    (1.3-7.7)  k/uL


 


Lymphocytes #  1.9    (1.0-4.8)  k/uL


 


Monocytes #  0.6    (0-1.0)  k/uL


 


Eosinophils #  0.1    (0-0.7)  k/uL


 


Basophils #  0.0    (0-0.2)  k/uL


 


Macrocytosis  Slight    


 


PT     (9.0-12.0)  sec


 


INR     (<1.2)  


 


APTT     (22.0-30.0)  sec


 


Sodium   146 H   (137-145)  mmol/L


 


Potassium   4.4   (3.5-5.1)  mmol/L


 


Chloride   111 H   ()  mmol/L


 


Carbon Dioxide   24   (22-30)  mmol/L


 


Anion Gap   11   mmol/L


 


BUN   41 H   (9-20)  mg/dL


 


Creatinine   1.67 H   (0.66-1.25)  mg/dL


 


Est GFR (CKD-EPI)AfAm   42   (>60 ml/min/1.73 sqM)  


 


Est GFR (CKD-EPI)NonAf   37   (>60 ml/min/1.73 sqM)  


 


Glucose   99   (74-99)  mg/dL


 


Plasma Lactic Acid Pradeep    0.9  (0.7-2.0)  mmol/L


 


Calcium   9.3   (8.4-10.2)  mg/dL


 


Total Bilirubin   0.5   (0.2-1.3)  mg/dL


 


AST   25   (17-59)  U/L


 


ALT   41   (21-72)  U/L


 


Alkaline Phosphatase   116   ()  U/L


 


Troponin I     (0.000-0.034)  ng/mL


 


NT-Pro-B Natriuret Pep     pg/mL


 


Total Protein   8.0   (6.3-8.2)  g/dL


 


Albumin   3.8   (3.5-5.0)  g/dL














  11/24/19 11/24/19 11/24/19 Range/Units





  08:00 08:00 08:00 


 


WBC     (3.8-10.6)  k/uL


 


RBC     (4.30-5.90)  m/uL


 


Hgb     (13.0-17.5)  gm/dL


 


Hct     (39.0-53.0)  %


 


MCV     (80.0-100.0)  fL


 


MCH     (25.0-35.0)  pg


 


MCHC     (31.0-37.0)  g/dL


 


RDW     (11.5-15.5)  %


 


Plt Count     (150-450)  k/uL


 


Neutrophils %     %


 


Lymphocytes %     %


 


Monocytes %     %


 


Eosinophils %     %


 


Basophils %     %


 


Neutrophils #     (1.3-7.7)  k/uL


 


Lymphocytes #     (1.0-4.8)  k/uL


 


Monocytes #     (0-1.0)  k/uL


 


Eosinophils #     (0-0.7)  k/uL


 


Basophils #     (0-0.2)  k/uL


 


Macrocytosis     


 


PT   11.2   (9.0-12.0)  sec


 


INR   1.1   (<1.2)  


 


APTT   27.1   (22.0-30.0)  sec


 


Sodium     (137-145)  mmol/L


 


Potassium     (3.5-5.1)  mmol/L


 


Chloride     ()  mmol/L


 


Carbon Dioxide     (22-30)  mmol/L


 


Anion Gap     mmol/L


 


BUN     (9-20)  mg/dL


 


Creatinine     (0.66-1.25)  mg/dL


 


Est GFR (CKD-EPI)AfAm     (>60 ml/min/1.73 sqM)  


 


Est GFR (CKD-EPI)NonAf     (>60 ml/min/1.73 sqM)  


 


Glucose     (74-99)  mg/dL


 


Plasma Lactic Acid Pradeep     (0.7-2.0)  mmol/L


 


Calcium     (8.4-10.2)  mg/dL


 


Total Bilirubin     (0.2-1.3)  mg/dL


 


AST     (17-59)  U/L


 


ALT     (21-72)  U/L


 


Alkaline Phosphatase     ()  U/L


 


Troponin I    0.021  (0.000-0.034)  ng/mL


 


NT-Pro-B Natriuret Pep  1990    pg/mL


 


Total Protein     (6.3-8.2)  g/dL


 


Albumin     (3.5-5.0)  g/dL














- EKG Data


EKG Comments: 


Ventricular rate 50 bpm QRS duration 98 ms QT//410 ms.  This is 

electronically atrial paced rhythm low voltage QRS.  Significant artifact noted 

otherwise no specific changes.  No ST elevation or depression noted.








Disposition


Clinical Impression: 


 Dyspnea, Hypoxia, CHF exacerbation, COPD exacerbation, Extremity edema





Disposition: ADMITTED AS IP TO THIS HOSP


Condition: Stable


Is patient prescribed a controlled substance at d/c from ED?: No


Referrals: 


Gaurang Mack MD [Primary Care Provider] - 1-2 days


Time of Disposition: 09:29


Decision to Admit Reason: Admit from EC


Decision Date: 11/24/19


Decision Time: 09:29

## 2019-11-25 LAB
ANION GAP SERPL CALC-SCNC: 13 MMOL/L
BUN SERPL-SCNC: 45 MG/DL (ref 9–20)
CALCIUM SPEC-MCNC: 9.5 MG/DL (ref 8.4–10.2)
CHLORIDE SERPL-SCNC: 108 MMOL/L (ref 98–107)
CO2 SERPL-SCNC: 25 MMOL/L (ref 22–30)
ERYTHROCYTE [DISTWIDTH] IN BLOOD BY AUTOMATED COUNT: 3.26 M/UL (ref 4.3–5.9)
ERYTHROCYTE [DISTWIDTH] IN BLOOD BY AUTOMATED COUNT: 3.28 M/UL (ref 4.3–5.9)
ERYTHROCYTE [DISTWIDTH] IN BLOOD: 15.8 % (ref 11.5–15.5)
ERYTHROCYTE [DISTWIDTH] IN BLOOD: 16 % (ref 11.5–15.5)
GLUCOSE SERPL-MCNC: 116 MG/DL (ref 74–99)
HCT VFR BLD AUTO: 32 % (ref 39–53)
HCT VFR BLD AUTO: 32.1 % (ref 39–53)
HGB BLD-MCNC: 10.6 GM/DL (ref 13–17.5)
HGB BLD-MCNC: 10.6 GM/DL (ref 13–17.5)
MCH RBC QN AUTO: 32.5 PG (ref 25–35)
MCH RBC QN AUTO: 32.6 PG (ref 25–35)
MCHC RBC AUTO-ENTMCNC: 33.1 G/DL (ref 31–37)
MCHC RBC AUTO-ENTMCNC: 33.3 G/DL (ref 31–37)
MCV RBC AUTO: 97.5 FL (ref 80–100)
MCV RBC AUTO: 98.5 FL (ref 80–100)
PLATELET # BLD AUTO: 126 K/UL (ref 150–450)
PLATELET # BLD AUTO: 128 K/UL (ref 150–450)
POTASSIUM SERPL-SCNC: 4.6 MMOL/L (ref 3.5–5.1)
SODIUM SERPL-SCNC: 146 MMOL/L (ref 137–145)
WBC # BLD AUTO: 6.6 K/UL (ref 3.8–10.6)
WBC # BLD AUTO: 9.1 K/UL (ref 3.8–10.6)

## 2019-11-25 RX ADMIN — ATENOLOL SCH MG: 50 TABLET ORAL at 08:59

## 2019-11-25 RX ADMIN — FORMOTEROL FUMARATE DIHYDRATE SCH: 20 SOLUTION RESPIRATORY (INHALATION) at 21:32

## 2019-11-25 RX ADMIN — PANTOPRAZOLE SODIUM SCH MG: 40 INJECTION, POWDER, FOR SOLUTION INTRAVENOUS at 20:12

## 2019-11-25 RX ADMIN — PANTOPRAZOLE SODIUM SCH MG: 40 INJECTION, POWDER, FOR SOLUTION INTRAVENOUS at 09:01

## 2019-11-25 RX ADMIN — DOXAZOSIN MESYLATE SCH MG: 4 TABLET ORAL at 08:59

## 2019-11-25 RX ADMIN — ISOSORBIDE MONONITRATE SCH MG: 30 TABLET, EXTENDED RELEASE ORAL at 08:59

## 2019-11-25 RX ADMIN — IPRATROPIUM BROMIDE AND ALBUTEROL SULFATE SCH: .5; 3 SOLUTION RESPIRATORY (INHALATION) at 21:32

## 2019-11-25 RX ADMIN — FORMOTEROL FUMARATE DIHYDRATE SCH: 20 SOLUTION RESPIRATORY (INHALATION) at 07:11

## 2019-11-25 RX ADMIN — IPRATROPIUM BROMIDE AND ALBUTEROL SULFATE SCH: .5; 3 SOLUTION RESPIRATORY (INHALATION) at 16:44

## 2019-11-25 RX ADMIN — FUROSEMIDE SCH MG: 10 INJECTION, SOLUTION INTRAMUSCULAR; INTRAVENOUS at 09:01

## 2019-11-25 RX ADMIN — Medication SCH MG: at 20:12

## 2019-11-25 RX ADMIN — IPRATROPIUM BROMIDE AND ALBUTEROL SULFATE SCH: .5; 3 SOLUTION RESPIRATORY (INHALATION) at 07:11

## 2019-11-25 RX ADMIN — ATENOLOL SCH MG: 50 TABLET ORAL at 20:12

## 2019-11-25 RX ADMIN — MIRTAZAPINE SCH MG: 15 TABLET, FILM COATED ORAL at 20:12

## 2019-11-25 RX ADMIN — IPRATROPIUM BROMIDE AND ALBUTEROL SULFATE SCH ML: .5; 3 SOLUTION RESPIRATORY (INHALATION) at 11:05

## 2019-11-25 RX ADMIN — ATORVASTATIN CALCIUM SCH MG: 20 TABLET, FILM COATED ORAL at 20:12

## 2019-11-25 RX ADMIN — HYDROCORTISONE SCH APPLIC: 25 CREAM TOPICAL at 21:04

## 2019-11-25 NOTE — P.CRDCN
History of Present Illness


Consult date: 11/25/19


Requesting physician: Elie Carpenter


Consult reason: congestive heart failure


Chief complaint: Shortness of breath


History of present illness: 


This is an 86 year old  gentleman, history of dementia, history was 

obtained mainly from the medical record as the patient is quite confused this m

orning.  He has a history of coronary artery disease with prior bypass surgery, 

prior stent placement, history of pacemaker with possible defibrillator, 

hypertension, hyperlipidemia, history of CVA, his daughter Larissa is the power of

.  Patient was brought to the hospital apparently because of symptoms of

progressively worsening shortness of breath.  His mentation is also noted to be 

much more worse than his usual.  While here in the hospital, the patient was 

admitted to the cardiac unit where he was noticed to have a bright red blood per

rectum when they were rolling the patient over in bed.  His chest x-ray on 

presentation here showed borderline cardiomegaly with increasing interstitial 

changes.  New small effusions with adjacent atelectasis and/or consolidation.  

Correlate for CHF with pulmonary vascular congestion.  EKG on presentation here 

shows a normal sinus rhythm with no acute changes.  CAT scan of the brain shows 

chronic CVA, age-related changes of atrophy and chronic small vessel ischemia.  

CTA of the brain showed cerebral vascular disease.  Blood pressure on arrival 

here 140/70 with a heart rate in the 50s, 91% on room air.  I pressure this 

morning 156/50 with a heart rate in the 60s, 95% on 4 L of oxygen.  He is 

afebrile.  White blood cell count is normal, hemoglobin 10.6, platelet count 

128.  Sodium 146, potassium 4.6, chloride 108, BUN is 45, creatinine 1.9 this 

morning, 1.6 on admission.  Troponins 0.021, 0.014, 0.019.  BNP level in 1990.  

TSH 2.2, cortisol level16.  At the time of my examination this morning, the 

patient is quite confused, he is unsure of where he is at, and he is pointing to

things in the room that do not exist.





Past Medical History


Past Medical History: Cancer, Chest Pain / Angina, Heart Failure, CVA/TIA, 

Hyperlipidemia, Hypertension, Memory Impairment


History of Any Multi-Drug Resistant Organisms: None Reported


Past Surgical History: Heart Catheterization With Stent, Pacemaker


Additional Past Surgical History / Comment(s): Open Heart surgery


Past Anesthesia/Blood Transfusion Reactions: No Reported Reaction


Date of Last Stent Placement:: 2015


Type of Cardiac Device: Permanent Pacemaker


Device Placement Date:: 2015


Past Psychological History: No Psychological Hx Reported


Smoking Status: Never smoker


Past Alcohol Use History: None Reported


Past Drug Use History: None Reported





- Past Family History


  ** Father


Family Medical History: No Reported History





Medications and Allergies


                                Home Medications











 Medication  Instructions  Recorded  Confirmed  Type


 


Acetaminophen [Tylenol] 500 - 1,000 mg PO BID PRN 09/21/19 11/24/19 History


 


Albuterol Inhaler [Ventolin Hfa 1 - 2 puff INHALATION RT-Q4H PRN 09/21/19 11/ 24/19 History





Inhaler]    


 


Atenolol [Tenormin] 50 mg PO BID@0800,2000 09/21/19 11/24/19 History


 


Atorvastatin [Lipitor] 20 mg PO HS@2000 09/21/19 11/24/19 History


 


Cholecalciferol (Vitamin D3) 2,000 unit PO DAILY@0800 09/21/19 11/24/19 History





[Vitamin D3]    


 


Clopidogrel Bisulfate [Plavix] 75 mg PO DAILY@0800 09/21/19 11/24/19 History


 


Enalapril Maleate [Vasotec] 20 mg PO BID@0800,2000 09/21/19 11/24/19 History


 


Folic Acid 0.4 mg PO DAILY@0800 09/21/19 11/24/19 History


 


Guaifenesin/Dextromethorphan 2.5 - 5 ml PO Q4-6H PRN 09/21/19 11/24/19 History





[guaiFENesin DM]    


 


Loratadine 10 mg PO DAILY@0800 09/21/19 11/24/19 History


 


Melatonin 10 mg PO HS@2000 09/21/19 11/24/19 History


 


Mirtazapine 30 mg PO HS@2000 09/21/19 11/24/19 History


 


Doxazosin [Cardura] 4 mg PO DAILY@0800 11/24/19 11/24/19 History


 


Ipratropium-Albuterol Nebulize 3 ml INHALATION RT-DAILY PRN 11/24/19 11/24/19 

History





[Duoneb 0.5 mg-3 mg/3 ml Soln]    


 


Ipratropium-Albuterol Nebulize 3 ml INHALATION RT-QID 11/24/19 11/24/19 History





[Duoneb 0.5 mg-3 mg/3 ml Soln]    


 


amLODIPine [Norvasc] 10 mg PO DAILY@0700 11/24/19 11/24/19 History








                                    Allergies











Allergy/AdvReac Type Severity Reaction Status Date / Time


 


Penicillins Allergy  Unknown Verified 11/24/19 09:50














Physical Exam


Vitals: 


                                   Vital Signs











  Temp Pulse Pulse Resp BP BP Pulse Ox


 


 11/25/19 08:00  98.0 F   62  16   156/54  95


 


 11/25/19 04:00  98.2 F   64  18   152/68  94 L


 


 11/24/19 23:26  98.2 F   65  18   165/72  95


 


 11/24/19 20:52   64     


 


 11/24/19 20:33   68     


 


 11/24/19 20:00  98 F   69  18   181/77  94 L


 


 11/24/19 16:50   54 L     


 


 11/24/19 16:40   58 L      98


 


 11/24/19 16:05     14    92 L


 


 11/24/19 16:00  94.8 F L   50 L  14   137/64  87 L


 


 11/24/19 12:23   52 L     


 


 11/24/19 12:18   52 L     


 


 11/24/19 12:00  92.1 F L   53 L  18   175/85  93 L


 


 11/24/19 10:17   51 L   18  122/66   93 L


 


 11/24/19 09:43   48 L     


 


 11/24/19 09:31   49 L     


 


 11/24/19 09:14   49 L   18  137/64   93 L








                                Intake and Output











 11/24/19 11/25/19 11/25/19





 22:59 06:59 14:59


 


Intake Total  100 


 


Balance  100 


 


Intake:   


 


  Oral  100 


 


Other:   


 


  Voiding Method   Diaper





   Incontinent


 


  # Voids 4 1 1


 


  Weight  99 kg 














PHYSICAL EXAMINATION: 





GENERAL: 86 stroke  gentleman in no acute distress at the time of my 

examination





HEENT: Head is atraumatic, normocephalic.  Pupils equal, round.  Sclera 

anicteric. Conjunctiva are clear.  Mucous membranes of the mouth are moist.  N

messi is supple.  There is  elevated jugular venous pressure.  No carotid  bruit 

is heard.





HEART EXAMINATION: Heart S1 and S2 with soft systolic murmur is heard





CHEST EXAMINATION: On's reveal diminished air entry to the bases bilaterally





ABDOMEN: [ Soft, nontender. Bowel sounds are heard. No organomegaly noted].


 


EXTREMITIES:[ 2+ peripheral pulses with 1-2+  evidence of peripheral edema and 

no calf tenderness noted].





NEUROLOGIC [patient is awake, alert, confused


 








Results





                                 11/25/19 05:31





                                 11/25/19 05:31


                                 Cardiac Enzymes











  11/24/19 11/24/19 11/24/19 Range/Units





  08:00 08:00 17:49 


 


AST  25    (17-59)  U/L


 


Troponin I   0.021  0.014  (0.000-0.034)  ng/mL














  11/24/19 Range/Units





  23:52 


 


AST   (17-59)  U/L


 


Troponin I  0.019  (0.000-0.034)  ng/mL








                                   Coagulation











  11/24/19 Range/Units





  08:00 


 


PT  11.2  (9.0-12.0)  sec


 


APTT  27.1  (22.0-30.0)  sec








                                       CBC











  11/24/19 11/24/19 11/24/19 Range/Units





  12:34 17:49 23:52 


 


WBC  7.7  7.2  6.6  (3.8-10.6)  k/uL


 


RBC  3.47 L  3.47 L  3.28 L  (4.30-5.90)  m/uL


 


Hgb  11.1 L  11.1 L  10.6 L  (13.0-17.5)  gm/dL


 


Hct  34.5 L  34.3 L  32.0 L  (39.0-53.0)  %


 


Plt Count  112 L  118 L  126 L  (150-450)  k/uL














  11/25/19 Range/Units





  05:31 


 


WBC  9.1  (3.8-10.6)  k/uL


 


RBC  3.26 L  (4.30-5.90)  m/uL


 


Hgb  10.6 L  (13.0-17.5)  gm/dL


 


Hct  32.1 L  (39.0-53.0)  %


 


Plt Count  128 L  (150-450)  k/uL








                          Comprehensive Metabolic Panel











  11/24/19 11/24/19 11/25/19 Range/Units





  08:00 17:49 05:31 


 


Sodium  146 H  146 H  146 H  (137-145)  mmol/L


 


Potassium  4.4  4.7  4.6  (3.5-5.1)  mmol/L


 


Chloride  111 H  109 H  108 H  ()  mmol/L


 


Carbon Dioxide  24  25  25  (22-30)  mmol/L


 


BUN  41 H  41 H  45 H  (9-20)  mg/dL


 


Creatinine  1.67 H  1.72 H  1.98 H  (0.66-1.25)  mg/dL


 


Glucose  99  155 H  116 H  (74-99)  mg/dL


 


Calcium  9.3  9.5  9.5  (8.4-10.2)  mg/dL


 


AST  25    (17-59)  U/L


 


ALT  41    (21-72)  U/L


 


Alkaline Phosphatase  116    ()  U/L


 


Total Protein  8.0    (6.3-8.2)  g/dL


 


Albumin  3.8    (3.5-5.0)  g/dL








                               Current Medications











Generic Name Dose Route Start Last Admin





  Trade Name Freq  PRN Reason Stop Dose Admin


 


Acetaminophen  650 mg  11/24/19 12:35 





  Tylenol Tab  PO  





  Q6HR PRN  





  Mild Pain or Fever > 100.5  


 


Albuterol/Ipratropium  3 ml  11/24/19 09:25 





  Duoneb 0.5 Mg-3 Mg/3 Ml Soln  INHALATION  





  RT-Q4H PRN  





  Shortness Of Breath Or Wheezing  


 


Albuterol/Ipratropium  3 ml  11/24/19 12:00  11/25/19 07:11





  Duoneb 0.5 Mg-3 Mg/3 Ml Soln  INHALATION   Not Given





  RT-QID [facility]  


 


Amlodipine Besylate  10 mg  11/25/19 07:00 





  Norvasc  PO  





  DAILY@0700 [facility]  


 


Atenolol  50 mg  11/24/19 20:00  11/24/19 19:55





  Tenormin  PO   50 mg





  BID@0800,2000 [facility]   Administration


 


Atorvastatin Calcium  20 mg  11/24/19 20:00  11/24/19 19:55





  Lipitor  PO   20 mg





  HS@2000 [facility]   Administration


 


Doxazosin Mesylate  4 mg  11/25/19 08:00 





  Cardura  PO  





  DAILY@0800 [facility]  


 


Formoterol Fumarate  20 mcg  11/24/19 20:00  11/25/19 07:11





  Perforomist  INHALATION   Not Given





  RT-BID [facility]  


 


Furosemide  40 mg  11/25/19 09:00 





  Lasix  IV  





  DAILY [facility]  


 


Haloperidol Lactate  1 mg  11/24/19 21:38 





  Haldol  IM  





  Q4HR PRN  





  Agitation or Acute Psychosis  


 


Melatonin  10 mg  11/24/19 20:00  11/24/19 19:55





  Melatonin  PO   10 mg





  HS@2000 [facility]   Administration


 


Mirtazapine  30 mg  11/24/19 20:00  11/24/19 19:54





  Remeron  PO   30 mg





  HS@2000 [facility]   Administration


 


Naloxone HCl  0.2 mg  11/24/19 12:35 





  Narcan  IV  





  Q2M PRN  





  Opioid Reversal  


 


Pantoprazole Sodium  40 mg  11/24/19 12:45  11/24/19 20:01





  Protonix  IVP   40 mg





  BID [facility]   Administration


 


Prednisone  40 mg  11/25/19 09:00 





  PO  





  DAILY [facility]  








                                Intake and Output











 11/24/19 11/25/19 11/25/19





 22:59 06:59 14:59


 


Intake Total  100 


 


Balance  100 


 


Intake:   


 


  Oral  100 


 


Other:   


 


  Voiding Method   Diaper





   Incontinent


 


  # Voids 4 1 1


 


  Weight  99 kg 








                                        





                                 11/25/19 05:31 





                                 11/25/19 05:31 











EKG Interpretations (text)





EKG shows normal sinus rhythm with nonspecific changes in the lateral leads.





Assessment and Plan


Plan: 


Assessment and plan


#1 congestive heart failure, LV function unknown


#2 bright red bleeding per rectum, possible acute GI bleed.


#3 coronary artery disease with prior bypass surgery and stent placements exact 

details unavailable


#4 acute on chronic kidney injury


#5 history of a pacemaker/defibrillator


#6 hypertension


#7 hyperlipidemia


#8 dementia with a worsening in mentation





Plan


We will obtain an echocardiogram with Doppler study.  Aspirin and Plavix 

currently on hold.  Continue current dose of IV Lasix monitoring renal function 

closely.  Patient is currently not on an ACE inhibitor because of the abnormal r

enal function.  He is on atenolol and Norvasc, we will add a small dose of 

hydralazine and nitrates.  Further recommendations to follow.





DNP note has been reviewed, I agree with a documented findings and plan of care.

  Patient was seen and examined.

## 2019-11-25 NOTE — CDI
Documentation Clarification Form



Date: 11/25/2019 1:26:51 PM

From: Kristen Junior RN, CCDS

Phone: [phone]

MRN: [id number]

Admit Date: 11/24/2019 9:25:00 AM

Patient Name: Deandre Berg

Visit Number: [id number]



ATTENTION: The Clinical Documentation Specialists (CDI) and [facility] Coding Staff 
appreciate your assistance in clarifying documentation. Please respond to the 
clarification below the line at the bottom and electronically sign. The CDI & 
[facility] Coding staff will review the response and follow-up if needed. Please note: 
Queries are made part of the Legal Health Record. If you have any questions, 
please contact the author of this message via ITS.



Dr. Theresa Allison



Patient was admitted with AWA and CHF exacerbation has documented "chronic renal
insufficiency" that requires further clarification.



History/Risk Factors: 

 CHF, CVA, HTN



Clinical Indicators: 

Current BUN: 41/42

CR: 1.67/1.72/1.98

GFR: 37/35/30

9/24/19 Patients Baseline BUN/CR/GFR: 56/1.79/34      



Treatment:   

IVF @ 20 cc/hr

Lasix 40 mg IVP QD

Apresoline 25 mg PO BID

Imdur 30 mg PO QD

Cardura 4 mg PO QD



In order to capture the severity of condition, please clarify if the condition 
signifies:



CKD Stage 1 (GFR > 90)

CKD Stage 2 (GFR 60-89)

CKD Stage 3 (GFR 30-59)

CKD Stage 4 (GFR 15-29)

CKD Stage 5 (GFR <15)

ESRD

Other, please specify ___________

Unable to determine



(Last Revision: April 2018)

________________________ckd stage 
3___________________________________________________

MTDD

## 2019-11-25 NOTE — ECHOF
Referral Reason:SOB



MEASUREMENTS

--------

HEIGHT: 162.6 cm

WEIGHT: 97.1 kg

BP: 152/68

IVSd:   0.8 cm     (0.6 - 1.1)

LVIDd:   4.5 cm     (3.9 - 5.3)

LVPWd:   0.9 cm     (0.6 - 1.1)

IVSs:   1.3 cm

LVIDs:   3.3 cm

LVPWs:   1.0 cm

LAESV Index (A-L):   35.01 ml/m

MV E Domingo:   0.82 m/s

MV DecT:   99 ms

MV A Domingo:   0.82 m/s

MV E/A Ratio:   0.99 

RAP:   5.00 mmHg

RVSP:   36.35 mmHg







FINDINGS

--------

Sinus rhythm.

This was a technically difficult study with suboptimal views.

This was a techncally difficult study with suboptimal views, , Lumason utilized for enhancement of im
ages.

The left ventricular size is normal.   Left ventricular wall thickness is normal.   Overall left vent
ricular systolic function is mild-moderately impaired with, an EF between 40 - 45 %.   Inferiorlatera
l Hypokinesis

The right ventricle is normal in size.

The left atrium is mildly dilated.   LA is midly dilated 29-33ml/m2.

The right atrial size is normal.

5.0mg OF Lumason UTLIZED: 2 OR MORE WALL SEGMENTS NOT VISUALIZED.

The aortic valve was not well visualized.

Mild mitral annular calcification present.

Mild tricuspid regurgitation present.   There is mild pulmonary hypertension.   The right ventricular
 systolic pressure, as measured by Doppler, is 36.35mmHg.

The pulmonic valve was not well visualized.

The aortic root size is normal.

Echo free space represents a pericardial fat pad.



CONCLUSIONS

--------

1. Sinus rhythm.

2. This was a technically difficult study with suboptimal views.

3. This was a techncally difficult study with suboptimal views, , Lumason utilized for enhancement of
 images.

4. The left ventricular size is normal.

5. Left ventricular wall thickness is normal.

6. Overall left ventricular systolic function is mild-moderately impaired with, an EF between 40 - 45
 %.

7. Inferiorlateral Hypokinesis

8. The right ventricle is normal in size.

9. The left atrium is mildly dilated.

10. LA is midly dilated 29-33ml/m2.

11. The right atrial size is normal.

12. 5.0mg OF Lumason UTLIZED: 2 OR MORE WALL SEGMENTS NOT VISUALIZED.

13. The aortic valve was not well visualized.

14. Mild mitral annular calcification present.

15. Mild tricuspid regurgitation present.

16. There is mild pulmonary hypertension.

17. The right ventricular systolic pressure, as measured by Doppler, is 36.35mmHg.

18. The pulmonic valve was not well visualized.

19. The aortic root size is normal.

20. Echo free space represents a pericardial fat pad.





SONOGRAPHER: Delma Valladares RDCS

## 2019-11-25 NOTE — P.PN
Subjective


Progress Note Date: 11/25/19


Principal diagnosis: 





CHF exacerbation, rectal bleed, encephalopathy, hypothermia





Patient was seen and examined.  No acute events overnight.  He is pleasantly 

confused this morning but more awake.  Patient thinks that the current president

is Recinos and this is 1984.  He has no complaints.  He denies any chest pain, 

shortness of breath or palpitations.





Objective





- Vital Signs


Vital signs: 


                                   Vital Signs











Temp  98.0 F   11/25/19 08:00


 


Pulse  62   11/25/19 08:00


 


Resp  16   11/25/19 08:00


 


BP  156/54   11/25/19 08:00


 


Pulse Ox  95   11/25/19 08:00








                                 Intake & Output











 11/24/19 11/25/19 11/25/19





 18:59 06:59 18:59


 


Intake Total  100 


 


Balance  100 


 


Weight 90.718 kg 99 kg 


 


Intake:   


 


  Oral  100 


 


Other:   


 


  Voiding Method   Diaper





   Incontinent


 


  # Voids 4 1 1














- Exam





General: [non toxic], [no distress on 4 L nasal cannula pleasantly confused], 

[appears at stated age]


Derm: [warm], [dry]


Head: [atraumatic], [normocephalic], [symmetric]


Eyes: [EOMI], [no lid lag], [anicteric sclera]


Mouth: [no lip lesion], [mucus membranes moist]


Cardiovascular: [S1S2 reg], [bradycardia], [positive DP pulse bilateral], 


Lungs: [Diffuse expiratory wheezing bilaterally], [no rhonchi, no rales] , [no 

accessory muscle use]


Abdominal: [soft], [ nontender to palpation], [no guarding], [no appreciable 

organomegaly]


Ext: [no gross muscle atrophy], [1+ pitting lower extremity bilateral edema], 

[no contractures]


Neuro: [No focal deficits]


Psych: [Alert and oriented 0 suspect baseline] 





- Labs


CBC & Chem 7: 


                                 11/25/19 05:31





                                 11/25/19 05:31


Labs: 


                  Abnormal Lab Results - Last 24 Hours (Table)











  11/24/19 11/24/19 11/24/19 Range/Units





  12:34 14:04 17:49 


 


RBC  3.47 L   3.47 L  (4.30-5.90)  m/uL


 


Hgb  11.1 L   11.1 L  (13.0-17.5)  gm/dL


 


Hct  34.5 L   34.3 L  (39.0-53.0)  %


 


RDW  15.6 H   15.8 H  (11.5-15.5)  %


 


Plt Count  112 L   118 L  (150-450)  k/uL


 


Lymphocytes #  0.5 L    (1.0-4.8)  k/uL


 


Sodium     (137-145)  mmol/L


 


Chloride     ()  mmol/L


 


BUN     (9-20)  mg/dL


 


Creatinine     (0.66-1.25)  mg/dL


 


Glucose     (74-99)  mg/dL


 


POC Glucose (mg/dL)   149 H   (75-99)  mg/dL


 


Creatine Kinase     ()  U/L














  11/24/19 11/24/19 11/25/19 Range/Units





  17:49 23:52 05:31 


 


RBC   3.28 L  3.26 L  (4.30-5.90)  m/uL


 


Hgb   10.6 L  10.6 L  (13.0-17.5)  gm/dL


 


Hct   32.0 L  32.1 L  (39.0-53.0)  %


 


RDW   16.0 H  15.8 H  (11.5-15.5)  %


 


Plt Count   126 L  128 L  (150-450)  k/uL


 


Lymphocytes #     (1.0-4.8)  k/uL


 


Sodium  146 H    (137-145)  mmol/L


 


Chloride  109 H    ()  mmol/L


 


BUN  41 H    (9-20)  mg/dL


 


Creatinine  1.72 H    (0.66-1.25)  mg/dL


 


Glucose  155 H    (74-99)  mg/dL


 


POC Glucose (mg/dL)     (75-99)  mg/dL


 


Creatine Kinase  33 L    ()  U/L














  11/25/19 Range/Units





  05:31 


 


RBC   (4.30-5.90)  m/uL


 


Hgb   (13.0-17.5)  gm/dL


 


Hct   (39.0-53.0)  %


 


RDW   (11.5-15.5)  %


 


Plt Count   (150-450)  k/uL


 


Lymphocytes #   (1.0-4.8)  k/uL


 


Sodium  146 H  (137-145)  mmol/L


 


Chloride  108 H  ()  mmol/L


 


BUN  45 H  (9-20)  mg/dL


 


Creatinine  1.98 H  (0.66-1.25)  mg/dL


 


Glucose  116 H  (74-99)  mg/dL


 


POC Glucose (mg/dL)   (75-99)  mg/dL


 


Creatine Kinase   ()  U/L














Assessment and Plan


Assessment: 





Assessment and plan





Acute metabolic encephalopathy with hypothermia


Acute on chronic CHF exacerbation


COPD exacerbation


Bright red blood per rectum


Acute kidney injury


CAD post CABG and stent placement with permanent pacemaker and defibrillator, 

dyslipidemia and history of CVA


Hypertension





His mentation has improved this morning.  Rectal temperature per RN was 92 

yesterday, was on Mercedez Hugger, improved to 98F today.  No clear signs of 

infection or sepsis at this time.  Patient is DNR/DNI.  CT brain shows chronic 

cerebrovascular accident, age-related changes of atrophy and chronic small 

vessel ischemia.  CTA head and neck shows cerebrovascular disease.  TSH is 

within normal limits.  Cortisol is within normal limits.  Plans: Mercedez Hugger as 

needed.  Temperatures currently within normal limits we will continue to monito

r.  Myxedema coma ruled out.  Adrenal crisis ruled out.  No CVA.  Follow blood 

cultures.





BNP 1990 with chest x-ray showing vascular congestion.  Troponin 0.021, 0.014, 

0.019, ACS ruled out.  Plans: Start diuresis with Lasix 40 mg IV daily.  

Continue beta blocker.  Hold ACE inhibitor due to AWA.  Strict intake and 

output.  Daily weights.  Follow-up echocardiogram.  Follow-up cardiology 

consultation.  





Patient with diffuse wheezing on physical exam.  Plans: DuoNeb scheduled and as 

needed for shortness of breath and wheezing.  Start prednisone 40 mg by mouth 

daily.  O2 per NC to maintain O2 saturation greater than 92%.  Add formoterol 

neb twice a day.





Hemoglobin 11.1 2, 10.62.  Bright red blood per rectum seen by RN.  Plans: CBC

every 6 hours.  Start Protonix 40 mg IV twice a day.  Nothing by mouth.  Follow 

GI consultation.





Creatinine 1.98.  Likely due to dehydration.  Possible component of CKD.  Plans:

Avoid nephrotoxins.  Avoid IVF due to fluid overload state.  Repeat BMP tomorrow

morning.  Hold ACE inhibitor.





Plans: Hold aspirin and Plavix.  Resume beta blocker.





/54. Plans: Restart atenolol and amlodipine.  Cardiology added Imdur and 

hydralazine.  Monitor vitals adjust medications as necessary.





[Patient admitted for CHF/COPD exacerbation, currently on IV diuresis.  Also 

admitted for acute encephalopathy found to be hypothermic, mentation improving. 

 Found to have bright red blood per rectum, GI consultant, hemoglobin 

maintaining stable.  Patient is pending clinical improvement.  Prognosis is 

guarded.]

## 2019-11-26 LAB
ERYTHROCYTE [DISTWIDTH] IN BLOOD BY AUTOMATED COUNT: 3.29 M/UL (ref 4.3–5.9)
ERYTHROCYTE [DISTWIDTH] IN BLOOD BY AUTOMATED COUNT: 3.35 M/UL (ref 4.3–5.9)
ERYTHROCYTE [DISTWIDTH] IN BLOOD BY AUTOMATED COUNT: 3.75 M/UL (ref 4.3–5.9)
ERYTHROCYTE [DISTWIDTH] IN BLOOD: 15.8 % (ref 11.5–15.5)
HCT VFR BLD AUTO: 32.6 % (ref 39–53)
HCT VFR BLD AUTO: 33.4 % (ref 39–53)
HCT VFR BLD AUTO: 36.9 % (ref 39–53)
HGB BLD-MCNC: 10.5 GM/DL (ref 13–17.5)
HGB BLD-MCNC: 10.6 GM/DL (ref 13–17.5)
HGB BLD-MCNC: 12.3 GM/DL (ref 13–17.5)
MCH RBC QN AUTO: 31.6 PG (ref 25–35)
MCH RBC QN AUTO: 32 PG (ref 25–35)
MCH RBC QN AUTO: 32.9 PG (ref 25–35)
MCHC RBC AUTO-ENTMCNC: 31.6 G/DL (ref 31–37)
MCHC RBC AUTO-ENTMCNC: 32.3 G/DL (ref 31–37)
MCHC RBC AUTO-ENTMCNC: 33.4 G/DL (ref 31–37)
MCV RBC AUTO: 98.4 FL (ref 80–100)
MCV RBC AUTO: 99.2 FL (ref 80–100)
MCV RBC AUTO: 99.8 FL (ref 80–100)
PLATELET # BLD AUTO: 107 K/UL (ref 150–450)
PLATELET # BLD AUTO: 131 K/UL (ref 150–450)
PLATELET # BLD AUTO: 135 K/UL (ref 150–450)
WBC # BLD AUTO: 11.3 K/UL (ref 3.8–10.6)
WBC # BLD AUTO: 9.1 K/UL (ref 3.8–10.6)
WBC # BLD AUTO: 9.4 K/UL (ref 3.8–10.6)

## 2019-11-26 RX ADMIN — Medication SCH MG: at 20:49

## 2019-11-26 RX ADMIN — IPRATROPIUM BROMIDE AND ALBUTEROL SULFATE SCH ML: .5; 3 SOLUTION RESPIRATORY (INHALATION) at 11:16

## 2019-11-26 RX ADMIN — MIRTAZAPINE SCH MG: 15 TABLET, FILM COATED ORAL at 20:49

## 2019-11-26 RX ADMIN — IPRATROPIUM BROMIDE AND ALBUTEROL SULFATE SCH ML: .5; 3 SOLUTION RESPIRATORY (INHALATION) at 07:07

## 2019-11-26 RX ADMIN — ATENOLOL SCH MG: 50 TABLET ORAL at 20:49

## 2019-11-26 RX ADMIN — FORMOTEROL FUMARATE DIHYDRATE SCH MCG: 20 SOLUTION RESPIRATORY (INHALATION) at 07:07

## 2019-11-26 RX ADMIN — DOXAZOSIN MESYLATE SCH MG: 4 TABLET ORAL at 09:40

## 2019-11-26 RX ADMIN — PANTOPRAZOLE SODIUM SCH MG: 40 INJECTION, POWDER, FOR SOLUTION INTRAVENOUS at 20:49

## 2019-11-26 RX ADMIN — ISOSORBIDE MONONITRATE SCH MG: 30 TABLET, EXTENDED RELEASE ORAL at 09:40

## 2019-11-26 RX ADMIN — PANTOPRAZOLE SODIUM SCH MG: 40 INJECTION, POWDER, FOR SOLUTION INTRAVENOUS at 09:40

## 2019-11-26 RX ADMIN — IPRATROPIUM BROMIDE AND ALBUTEROL SULFATE SCH ML: .5; 3 SOLUTION RESPIRATORY (INHALATION) at 20:27

## 2019-11-26 RX ADMIN — METHYLPREDNISOLONE SODIUM SUCCINATE SCH MG: 125 INJECTION, POWDER, FOR SOLUTION INTRAMUSCULAR; INTRAVENOUS at 23:46

## 2019-11-26 RX ADMIN — HYDROCORTISONE SCH APPLIC: 25 CREAM TOPICAL at 09:40

## 2019-11-26 RX ADMIN — FUROSEMIDE SCH MG: 10 INJECTION, SOLUTION INTRAMUSCULAR; INTRAVENOUS at 09:40

## 2019-11-26 RX ADMIN — FORMOTEROL FUMARATE DIHYDRATE SCH MCG: 20 SOLUTION RESPIRATORY (INHALATION) at 20:27

## 2019-11-26 RX ADMIN — ATENOLOL SCH MG: 50 TABLET ORAL at 09:40

## 2019-11-26 RX ADMIN — ATORVASTATIN CALCIUM SCH MG: 20 TABLET, FILM COATED ORAL at 20:49

## 2019-11-26 RX ADMIN — METHYLPREDNISOLONE SODIUM SUCCINATE SCH MG: 125 INJECTION, POWDER, FOR SOLUTION INTRAMUSCULAR; INTRAVENOUS at 16:18

## 2019-11-26 RX ADMIN — HYDROCORTISONE SCH APPLIC: 25 CREAM TOPICAL at 20:50

## 2019-11-26 RX ADMIN — IPRATROPIUM BROMIDE AND ALBUTEROL SULFATE SCH ML: .5; 3 SOLUTION RESPIRATORY (INHALATION) at 16:38

## 2019-11-26 NOTE — P.PN
Subjective


Progress Note Date: 11/26/19


Principal diagnosis: 





CHF/COPD exacerbation, rectal bleed, hypothermia improving





Patient was seen and examined.  No acute events overnight.  Daughter is at 

bedside.  Daughter reports that the patient is considerably improved since 

admission.  Daughter states that patient appears overly confused however but 

does have a history of dementia and is usually confused with exposure to new 

environments.  Patient himself has no complaints.  He denies any chest pain, 

shortness of breath or palpitations.  No nausea or vomiting.  No fever or 

chills.





Objective





- Vital Signs


Vital signs: 


                                   Vital Signs











Temp  97.4 F L  11/26/19 08:00


 


Pulse  55 L  11/26/19 11:25


 


Resp  22   11/26/19 08:00


 


BP  194/82   11/26/19 08:00


 


Pulse Ox  94 L  11/26/19 08:00








                                 Intake & Output











 11/25/19 11/26/19 11/26/19





 18:59 06:59 18:59


 


Intake Total 480  


 


Balance 480  


 


Weight 99 kg 97 kg 


 


Intake:   


 


  Oral 480  


 


Other:   


 


  Voiding Method Diaper Diaper Diaper





 Incontinent Incontinent Incontinent


 


  # Voids 1 1 














- Exam





General: [non toxic], [no distress on 2 L nasal cannula pleasantly confused], 

[appears at stated age]


Derm: [warm], [dry]


Head: [atraumatic], [normocephalic], [symmetric]


Eyes: [EOMI], [no lid lag], [anicteric sclera]


Mouth: [no lip lesion], [mucus membranes moist]


Cardiovascular: [S1S2 reg], [bradycardia], [positive DP pulse bilateral], 


Lungs: [Diffuse expiratory wheezing bilaterally], [no rhonchi, no rales] , [no 

accessory muscle use]


Abdominal: [soft], [ nontender to palpation], [no guarding], [no appreciable 

organomegaly]


Ext: [no gross muscle atrophy], [1+ pitting lower extremity bilateral edema], 

[no contractures]


Neuro: [No focal deficits]


Psych: [Alert and oriented 0 suspect baseline] 





- Labs


CBC & Chem 7: 


                                 11/26/19 09:13





                                 11/25/19 05:31


Labs: 


                  Abnormal Lab Results - Last 24 Hours (Table)











  11/25/19 11/26/19 Range/Units





  22:20 09:13 


 


WBC   11.3 H  (3.8-10.6)  k/uL


 


RBC  3.75 L  3.29 L  (4.30-5.90)  m/uL


 


Hgb  12.3 L  10.5 L  (13.0-17.5)  gm/dL


 


Hct  36.9 L  32.6 L  (39.0-53.0)  %


 


RDW  15.8 H  15.8 H  (11.5-15.5)  %


 


Plt Count  107 L  131 L  (150-450)  k/uL








                      Microbiology - Last 24 Hours (Table)











 11/24/19 08:00 Blood Culture - Preliminary





 Blood    No Growth after 48 hours














Assessment and Plan


Assessment: 





Assessment and plan





Acute metabolic encephalopathy with hypothermia


Acute on chronic systolic CHF exacerbation


COPD exacerbation


Leukocytosis


Bright red blood per rectum


Acute kidney injury


CAD post CABG and stent placement with permanent pacemaker and defibrillator, 

dyslipidemia and history of CVA


Hypertension





His mentation has improved this morning.  Rectal temperature per RN was 92 on 

admission, was on Mercedez Hugger, improved to 97-98 F today without intervention. 

No clear signs of infection or sepsis at this time.  Patient is DNR/DNI.  CT 

brain shows chronic cerebrovascular accident, age-related changes of atrophy and

chronic small vessel ischemia.  CTA head and neck shows cerebrovascular disease.

 TSH is within normal limits.  Cortisol is within normal limits.  Plans: Mercedez 

Hugger as needed.  Temperatures currently within normal limits we will continue 

to monitor.  Myxedema coma ruled out.  Adrenal crisis ruled out.  No CVA.  

Follow blood cultures.





BNP 1990 with chest x-ray showing vascular congestion.  Troponin 0.021, 0.014, 

0.019, ACS ruled out.  Echocardiogram shows EF 40-45% with hypokinetic wall 

motion.  Plans: Start diuresis with Lasix 40 mg IV daily.  Continue beta 

blocker.  Hold ACE inhibitor due to AWA.  Strict intake and output.  Daily 

weights.  Follow-up cardiology consultation.  





Patient with diffuse wheezing on physical exam.  Plans: DuoNeb scheduled and as 

needed for shortness of breath and wheezing.  We will switch prednisone to Solu-

Medrol.  O2 per NC to maintain O2 saturation greater than 92%.  Add formoterol 

neb twice a day.





Hemoglobin 11.1 2, 10.5.  Bright red blood per rectum seen by RN on admission. 

Plans: CBC every 6 hours.  Start Protonix 40 mg IV twice a day.  NDD3 diet and 

advance.  GI consulted, recommends monitoring hemoglobin, no intervention 

planned for now.  





Creatinine 1.98.  Likely due to dehydration.  Possible component of CKD.  Plans:

Avoid nephrotoxins.  Avoid IVF due to fluid overload state.  Repeat BMP tomorrow

morning.  Hold ACE inhibitor.





Plans: Hold aspirin and Plavix.  Resume beta blocker.





/82. Plans: Restart atenolol and amlodipine.  Cardiology added Imdur and 

hydralazine.  Monitor vitals adjust medications as necessary.





[Patient admitted for CHF/COPD exacerbation, currently on IV diuresis, 

optimizing COPD medications, attempt to wean oxygen currently on 2 L nasal 

cannula.  Also admitted for acute encephalopathy found to be hypothermic, 

mentation and temperature improving.  Found to have bright red blood per rectum,

 GI consulted, hemoglobin maintaining stable.  Possible DC in 1-2 days if 

breathing is improved and no further GI bleed.  Temperature will need to be 

monitored at [facility] in [facility].]

## 2019-11-26 NOTE — P.PN
Subjective


Progress Note Date: 11/26/19





This is an 86 year old  gentleman, history of dementia, history was 

obtained mainly from the medical record as the patient is quite confused this 

morning.  He has a history of coronary artery disease with prior bypass surgery,

prior stent placement, history of pacemaker with possible defibrillator, 

hypertension, hyperlipidemia, history of CVA, his daughter Larissa is the power of

.  Patient was brought to the hospital apparently because of symptoms of

progressively worsening shortness of breath.  His mentation is also noted to be 

much more worse than his usual.  While here in the hospital, the patient was 

admitted to the cardiac unit where he was noticed to have a bright red blood per

rectum when they were rolling the patient over in bed.  His chest x-ray on 

presentation here showed borderline cardiomegaly with increasing interstitial 

changes.  New small effusions with adjacent atelectasis and/or consolidation.  

Correlate for CHF with pulmonary vascular congestion.  EKG on presentation here 

shows a normal sinus rhythm with no acute changes.  CAT scan of the brain shows 

chronic CVA, age-related changes of atrophy and chronic small vessel ischemia.  

CTA of the brain showed cerebral vascular disease.  Blood pressure on arrival 

here 140/70 with a heart rate in the 50s, 91% on room air.  I pressure this 

morning 156/50 with a heart rate in the 60s, 95% on 4 L of oxygen.  He is 

afebrile.  White blood cell count is normal, hemoglobin 10.6, platelet count 

128.  Sodium 146, potassium 4.6, chloride 108, BUN is 45, creatinine 1.9 this 

morning, 1.6 on admission.  Troponins 0.021, 0.014, 0.019.  BNP level in 1990.  

TSH 2.2, cortisol level16.  At the time of my examination this morning, the 

patient is quite confused, he is unsure of where he is at, and he is pointing to

things in the room that do not exist.








11/26/2019


Patient was seen and examined today, continues to be confused, no complaints, 

denies having any chest pain or shortness of breath.  Blood pressure 118/50 with

a heart rate in the 50s, 95% on room air.  White blood cell count 11.3, 

hemoglobin 10.5, platelet count 131.  Echocardiogram with Doppler study was 

performed which revealed an ejection fraction of 40-45%, inferior lateral 

hypokinesia.  Patient continues to be on IV Lasix.








Objective





- Vital Signs


Vital signs: 


                                   Vital Signs











Temp  97.4 F L  11/26/19 08:00


 


Pulse  52 L  11/26/19 15:12


 


Resp  20   11/26/19 15:12


 


BP  118/56   11/26/19 12:00


 


Pulse Ox  95   11/26/19 12:00








                                 Intake & Output











 11/25/19 11/26/19 11/26/19





 18:59 06:59 18:59


 


Intake Total 480  360


 


Balance 480  360


 


Weight 99 kg 97 kg 


 


Intake:   


 


  Oral 480  360


 


Other:   


 


  Voiding Method Diaper Diaper Diaper





 Incontinent Incontinent Incontinent


 


  # Voids 1 1 














- Exam





PHYSICAL EXAMINATION: 





GENERAL: 86 stroke  gentleman in no acute distress at the time of my 

examination





HEENT: Head is atraumatic, normocephalic.  Pupils equal, round.  Sclera 

anicteric. Conjunctiva are clear.  Mucous membranes of the mouth are moist.  

Neck is supple.  There is  elevated jugular venous pressure.  No carotid  bruit 

is heard.





HEART EXAMINATION: Heart S1 and S2 with soft systolic murmur is heard





CHEST EXAMINATION: On's reveal diminished air entry to the bases bilaterally





ABDOMEN: [ Soft, nontender. Bowel sounds are heard. No organomegaly noted].


 


EXTREMITIES:[ 2+ peripheral pulses with 1-2+  evidence of peripheral edema and 

no calf tenderness noted].





NEUROLOGIC [patient is awake, alert, confused





- Labs


CBC & Chem 7: 


                                 11/26/19 09:13





                                 11/25/19 05:31


Labs: 


                  Abnormal Lab Results - Last 24 Hours (Table)











  11/25/19 11/26/19 Range/Units





  22:20 09:13 


 


WBC   11.3 H  (3.8-10.6)  k/uL


 


RBC  3.75 L  3.29 L  (4.30-5.90)  m/uL


 


Hgb  12.3 L  10.5 L  (13.0-17.5)  gm/dL


 


Hct  36.9 L  32.6 L  (39.0-53.0)  %


 


RDW  15.8 H  15.8 H  (11.5-15.5)  %


 


Plt Count  107 L  131 L  (150-450)  k/uL








                      Microbiology - Last 24 Hours (Table)











 11/24/19 08:00 Blood Culture - Preliminary





 Blood    No Growth after 48 hours














Assessment and Plan


Plan: 


Assessment and plan


#1 congestive heart failure, LV function unknown


#2 bright red bleeding per rectum, possible acute GI bleed.


#3 coronary artery disease with prior bypass surgery and stent placements exact 

details unavailable


#4 acute on chronic kidney injury


#5 history of a pacemaker/defibrillator


#6 hypertension


#7 hyperlipidemia


#8 dementia with a worsening in mentation





Plan


Echocardiogram with Doppler study revealed an ejection fraction of 40-45%.  We 

will discontinue the Norvasc because of the reduced LV function, increase 

hydralazine.


DNP note has been reviewed, I agree with a documented findings and plan of care.

  Patient was seen and examined.

## 2019-11-26 NOTE — P.CONS
History of Present Illness





- Reason for Consult


Consult date: 11/25/19


GI bleed


Requesting physician: Theresa Allison





- Chief Complaint


Shortness of breath





- History of Present Illness





86-year-old male with multiple medical comorbidities including coronary artery 

disease, hypertension, dyslipidemia, prior CVA, congestive heart failure, COPD 

and dementia who presented for worsening shortness of breath.  Of note patient 

is extremely confused at baseline and the majority of the history has been taken

from review of the medical record and discussion with the medical team.  

Currently the patient is being evaluated by the cardiology team as treatment for

underlying COPD as suspected exacerbation of his congestive heart failure.  The 

patient did have episodes of some minimal bright red blood noted in the rectal 

area and the GI team was consult for further evaluation.  Since that time no 

further rectal bleeding has been noted.  Hemoglobin has remained stable 

currently at 10.6.  A known history of prior endoscopy or if the patient has had

a complicated GI bleeding in past.








Review of Systems


ROS unobtainable: due to mental status





Past Medical History


Past Medical History: Cancer, Chest Pain / Angina, Heart Failure, CVA/TIA, 

Hyperlipidemia, Hypertension, Memory Impairment


History of Any Multi-Drug Resistant Organisms: None Reported


Past Surgical History: Heart Catheterization With Stent, Pacemaker


Additional Past Surgical History / Comment(s): Open Heart surgery


Past Anesthesia/Blood Transfusion Reactions: No Reported Reaction


Date of Last Stent Placement:: 2015


Type of Cardiac Device: Permanent Pacemaker


Device Placement Date:: 2015


Past Psychological History: No Psychological Hx Reported


Smoking Status: Never smoker


Past Alcohol Use History: None Reported


Past Drug Use History: None Reported





- Past Family History


  ** Father


Family Medical History: No Reported History





Medications and Allergies


                                Home Medications











 Medication  Instructions  Recorded  Confirmed  Type


 


Acetaminophen [Tylenol] 500 - 1,000 mg PO BID PRN 09/21/19 11/24/19 History


 


Albuterol Inhaler [Ventolin Hfa 1 - 2 puff INHALATION RT-Q4H PRN 09/21/19 11/24/19 History





Inhaler]    


 


Atenolol [Tenormin] 50 mg PO BID@0800,2000 09/21/19 11/24/19 History


 


Atorvastatin [Lipitor] 20 mg PO HS@2000 09/21/19 11/24/19 History


 


Cholecalciferol (Vitamin D3) 2,000 unit PO DAILY@0800 09/21/19 11/24/19 History





[Vitamin D3]    


 


Clopidogrel Bisulfate [Plavix] 75 mg PO DAILY@0800 09/21/19 11/24/19 History


 


Enalapril Maleate [Vasotec] 20 mg PO BID@0800,2000 09/21/19 11/24/19 History


 


Folic Acid 0.4 mg PO DAILY@0800 09/21/19 11/24/19 History


 


Guaifenesin/Dextromethorphan 2.5 - 5 ml PO Q4-6H PRN 09/21/19 11/24/19 History





[guaiFENesin DM]    


 


Loratadine 10 mg PO DAILY@0800 09/21/19 11/24/19 History


 


Melatonin 10 mg PO HS@2000 09/21/19 11/24/19 History


 


Mirtazapine 30 mg PO HS@2000 09/21/19 11/24/19 History


 


Doxazosin [Cardura] 4 mg PO DAILY@0800 11/24/19 11/24/19 History


 


Ipratropium-Albuterol Nebulize 3 ml INHALATION RT-DAILY PRN 11/24/19 11/24/19 

History





[Duoneb 0.5 mg-3 mg/3 ml Soln]    


 


Ipratropium-Albuterol Nebulize 3 ml INHALATION RT-QID 11/24/19 11/24/19 History





[Duoneb 0.5 mg-3 mg/3 ml Soln]    


 


amLODIPine [Norvasc] 10 mg PO DAILY@0700 11/24/19 11/24/19 History








                                    Allergies











Allergy/AdvReac Type Severity Reaction Status Date / Time


 


Penicillins Allergy  Unknown Verified 11/24/19 09:50














Physical Exam


Vitals: 


                                   Vital Signs











  Temp Pulse Pulse Resp BP Pulse Ox


 


 11/25/19 16:44       96


 


 11/25/19 15:03    54 L  18  105/56  97


 


 11/25/19 11:36  97.4 F L   60  16  126/54  95


 


 11/25/19 11:18   92    


 


 11/25/19 11:08   96    


 


 11/25/19 08:00  98.0 F   62  16  156/54  95


 


 11/25/19 04:00  98.2 F   64  18  152/68  94 L


 


 11/24/19 23:26  98.2 F   65  18  165/72  95


 


 11/24/19 20:52   64    


 


 11/24/19 20:33   68    


 


 11/24/19 20:00  98 F   69  18  181/77  94 L


 


 11/24/19 16:50   54 L    








                                Intake and Output











 11/25/19 11/25/19 11/25/19





 06:59 14:59 22:59


 


Intake Total 100 480 


 


Balance 100 480 


 


Intake:   


 


  Oral 100 480 


 


Other:   


 


  Voiding Method  Diaper Diaper





  Incontinent Incontinent


 


  # Voids 1 1 


 


  Weight 99 kg 99 kg 














On physical examination, patient appears comfortable in no apparent distress. 


HEAD: Normocephalic, atraumatic. 


EYES: No scleral icterus. No conjunctival injection. 


MOUTH: No lesions, tongue midline. 


NECK: Trachea midline, no gross abnormalities. 


CHEST: Decreased air entry in all lung fields. 


HEART: S1-S2, no murmurs appreciated. 


ABDOMEN: Soft, obese. Bowel sounds are positive. No organomegaly.  No guarding 

or rigidity.


EXTREMITIES: No pedal edema. 


SKIN: No rashes, no jaundice. 


NEUROLOGIC: Alert and oriented to person. 





Results


CBC & Chem 7: 


                                 11/25/19 22:20





                                 11/25/19 05:31


Labs: 


                  Abnormal Lab Results - Last 24 Hours (Table)











  11/24/19 11/24/19 11/24/19 Range/Units





  17:49 17:49 23:52 


 


RBC  3.47 L   3.28 L  (4.30-5.90)  m/uL


 


Hgb  11.1 L   10.6 L  (13.0-17.5)  gm/dL


 


Hct  34.3 L   32.0 L  (39.0-53.0)  %


 


RDW  15.8 H   16.0 H  (11.5-15.5)  %


 


Plt Count  118 L   126 L  (150-450)  k/uL


 


Sodium   146 H   (137-145)  mmol/L


 


Chloride   109 H   ()  mmol/L


 


BUN   41 H   (9-20)  mg/dL


 


Creatinine   1.72 H   (0.66-1.25)  mg/dL


 


Glucose   155 H   (74-99)  mg/dL


 


Creatine Kinase   33 L   ()  U/L














  11/25/19 11/25/19 Range/Units





  05:31 05:31 


 


RBC  3.26 L   (4.30-5.90)  m/uL


 


Hgb  10.6 L   (13.0-17.5)  gm/dL


 


Hct  32.1 L   (39.0-53.0)  %


 


RDW  15.8 H   (11.5-15.5)  %


 


Plt Count  128 L   (150-450)  k/uL


 


Sodium   146 H  (137-145)  mmol/L


 


Chloride   108 H  ()  mmol/L


 


BUN   45 H  (9-20)  mg/dL


 


Creatinine   1.98 H  (0.66-1.25)  mg/dL


 


Glucose   116 H  (74-99)  mg/dL


 


Creatine Kinase    ()  U/L








                      Microbiology - Last 24 Hours (Table)











 11/24/19 08:00 Blood Culture - Preliminary





 Blood    No Growth after 24 hours











Chest x-ray: report reviewed (Cardiomegaly with borderline CHF with interstitial

 markings noted in the lung fields on extra)





Assessment and Plan


(1) Rectal bleeding


Narrative/Plan: 


86-year-old male with multiple medical comorbidities currently admitted for 

increasing shortness of breath and being treated for an acute exacerbation of C

HF and COPD.  The patient was noted to have some perirectal bleeding suspicious 

for hemorrhoidal bleeding.  No further episodes of bleeding since that time.


Current Visit: Yes   Status: Acute   Code(s): K62.5 - HEMORRHAGE OF ANUS AND 

RECTUM   SNOMED Code(s): 34189201


   


Plan: 





Supportive care


Continue to monitor hemoglobin and transfuse as needed


Okay for diet


Continue to monitor stool output


Will initiate Anusol steroid cream for local hemorrhoidal treatment


No plans for endoscopic evaluation at this time, however if further rectal 

bleeding or fall in hemoglobin we'll reevaluate at that time


Continue to hold antiplatelet therapy today, the patient remained stable can 

reinitiate in 48 hours


Thank you for allowing us to participate in the care of the patient we will 

continue to follow

## 2019-11-27 VITALS — SYSTOLIC BLOOD PRESSURE: 126 MMHG | RESPIRATION RATE: 20 BRPM | TEMPERATURE: 97.8 F | DIASTOLIC BLOOD PRESSURE: 55 MMHG

## 2019-11-27 VITALS — HEART RATE: 64 BPM

## 2019-11-27 LAB
ANION GAP SERPL CALC-SCNC: 14 MMOL/L
BUN SERPL-SCNC: 63 MG/DL (ref 9–20)
CALCIUM SPEC-MCNC: 9.7 MG/DL (ref 8.4–10.2)
CHLORIDE SERPL-SCNC: 107 MMOL/L (ref 98–107)
CO2 SERPL-SCNC: 24 MMOL/L (ref 22–30)
ERYTHROCYTE [DISTWIDTH] IN BLOOD BY AUTOMATED COUNT: 3.44 M/UL (ref 4.3–5.9)
ERYTHROCYTE [DISTWIDTH] IN BLOOD: 15.7 % (ref 11.5–15.5)
GLUCOSE BLD-MCNC: 136 MG/DL (ref 75–99)
GLUCOSE BLD-MCNC: 141 MG/DL (ref 75–99)
GLUCOSE SERPL-MCNC: 119 MG/DL (ref 74–99)
HCT VFR BLD AUTO: 34 % (ref 39–53)
HGB BLD-MCNC: 10.8 GM/DL (ref 13–17.5)
MCH RBC QN AUTO: 31.4 PG (ref 25–35)
MCHC RBC AUTO-ENTMCNC: 31.8 G/DL (ref 31–37)
MCV RBC AUTO: 98.7 FL (ref 80–100)
PLATELET # BLD AUTO: 136 K/UL (ref 150–450)
POTASSIUM SERPL-SCNC: 4.3 MMOL/L (ref 3.5–5.1)
SODIUM SERPL-SCNC: 145 MMOL/L (ref 137–145)
WBC # BLD AUTO: 9.1 K/UL (ref 3.8–10.6)

## 2019-11-27 RX ADMIN — FUROSEMIDE SCH MG: 10 INJECTION, SOLUTION INTRAMUSCULAR; INTRAVENOUS at 09:27

## 2019-11-27 RX ADMIN — METHYLPREDNISOLONE SODIUM SUCCINATE SCH MG: 125 INJECTION, POWDER, FOR SOLUTION INTRAMUSCULAR; INTRAVENOUS at 09:35

## 2019-11-27 RX ADMIN — HYDROCORTISONE SCH: 25 CREAM TOPICAL at 12:44

## 2019-11-27 RX ADMIN — INSULIN ASPART SCH UNIT: 100 INJECTION, SOLUTION INTRAVENOUS; SUBCUTANEOUS at 12:44

## 2019-11-27 RX ADMIN — ISOSORBIDE MONONITRATE SCH MG: 30 TABLET, EXTENDED RELEASE ORAL at 09:26

## 2019-11-27 RX ADMIN — IPRATROPIUM BROMIDE AND ALBUTEROL SULFATE SCH ML: .5; 3 SOLUTION RESPIRATORY (INHALATION) at 13:16

## 2019-11-27 RX ADMIN — ATENOLOL SCH MG: 50 TABLET ORAL at 09:27

## 2019-11-27 RX ADMIN — PANTOPRAZOLE SODIUM SCH MG: 40 INJECTION, POWDER, FOR SOLUTION INTRAVENOUS at 09:28

## 2019-11-27 RX ADMIN — INSULIN ASPART SCH UNIT: 100 INJECTION, SOLUTION INTRAVENOUS; SUBCUTANEOUS at 07:16

## 2019-11-27 RX ADMIN — FORMOTEROL FUMARATE DIHYDRATE SCH MCG: 20 SOLUTION RESPIRATORY (INHALATION) at 09:24

## 2019-11-27 RX ADMIN — IPRATROPIUM BROMIDE AND ALBUTEROL SULFATE SCH ML: .5; 3 SOLUTION RESPIRATORY (INHALATION) at 09:24

## 2019-11-27 RX ADMIN — DOXAZOSIN MESYLATE SCH MG: 4 TABLET ORAL at 09:28

## 2019-11-27 NOTE — P.DS
Providers


Date of admission: 


11/24/19 09:25





Expected date of discharge: 11/27/19


Attending physician: 


Elie Carpenter MD





Consults: 





                                        





11/24/19 09:25


Consult Physician Routine 


   Consulting Provider: Mayo Avendano


   Consult Reason/Comments: CHF exacerbation


   Do you want consulting provider notified?: Yes, Notify in am











Primary care physician: 


Gaurang Mack





Hospital Course: 





Discharge diagnoses





Acute metabolic encephalopathy with hypothermia


Acute on chronic systolic CHF exacerbation


Acute COPD exacerbation


Delirium superimposed on dementia


Acute kidney injury


Leukocytosis


anemia 


hemorrhoids 


History of CVA


history of coronary artery disease with prior bypass surgery and stent 

placements











Hospital course





The patient is a 86-year-old  male that was admitted from assisted 

living at [place] after he presented here with his daughter with 

reports of confusion and increasing shortness of breath and hypothermia.   

Patient was admitted for dyspnea of multifactorial etiology secondary to acute 

COPD exacerbation in the setting of acute on chronic systolic CHF flare.  A 

workup included a CT of the head that showed chronic CVA and age-related changes

of atrophy and chronic small vessel ischemia, echocardiogram showed LVEF of 40-

45% with inferior lateral hypokinesis, mildly dilated left atrium without any 

significant valvular abnormalities.  NT proBNP was 1990 initial chest x-ray 

showed cardiomegaly with increasing interstitial changes, troponin was 0.021, 

EKG showed sinus mechanism without any acute changes.  Patient was started on 

diuresis with IV Lasix And he was continued on atenolol and Norvasc.  Patient 

was noted to have a mild anemia with a hemoglobin of 11.1, GI was consulted and 

determined that it was likely the patient had hemorrhoids and patient was 

started on Anusol HC, the patient was also started on albuterol Atrovent 

bronchodilator DuoNeb along with systemic steroids with IV Solu-Medrol and 

formoterol.  She was noted to have acute kidney injury superimposed on chronic 

kidney disease due to diuresis with Lasix.  In discussion with the patient's 

family the patient's daughter medical DPOA elected for the patient to be placed 

on hospice on discharge to [facility].  This discharge process took 

approximately 35 minutes











Focused exam


Neuro: Pleasantly demented, patient awake and confused, cranial nerves II-12 

grossly intact


Patient Condition at Discharge: Stable





Plan - Discharge Summary


New Discharge Prescriptions: 


New


   hydrALAZINE HCL [Apresoline] 75 mg PO BID #60 tab


   Isosorbide Mononitrate ER [Imdur] 30 mg PO DAILY #30 tab.er.24h


   amLODIPine [Norvasc] 5 mg PO DAILY@0700 #30 tab


   predniSONE 40 mg PO DAILY  tab





Continue


   Albuterol Inhaler [Ventolin Hfa Inhaler] 1 - 2 puff INHALATION RT-Q4H PRN


     PRN Reason: Shortness Of Breath


   Acetaminophen [Tylenol] 500 - 1,000 mg PO BID PRN


     PRN Reason: Pain


   Folic Acid 0.4 mg PO DAILY@0800


   Cholecalciferol (Vitamin D3) [Vitamin D3] 2,000 unit PO DAILY@0800


   Mirtazapine 30 mg PO HS@2000


   Melatonin 10 mg PO HS@2000


   Loratadine 10 mg PO DAILY@0800


   Clopidogrel Bisulfate [Plavix] 75 mg PO DAILY@0800


   Atorvastatin [Lipitor] 20 mg PO HS@2000


   Atenolol [Tenormin] 50 mg PO BID@0800,2000


   Guaifenesin/Dextromethorphan [guaiFENesin DM] 2.5 - 5 ml PO Q4-6H PRN


     PRN Reason: Cough


   Doxazosin [Cardura] 4 mg PO DAILY@0800


   Ipratropium-Albuterol Nebulize [Duoneb 0.5 mg-3 mg/3 ml Soln] 3 ml INHALATION

RT-QID


   Ipratropium-Albuterol Nebulize [Duoneb 0.5 mg-3 mg/3 ml Soln] 3 ml INHALATION

RT-DAILY PRN


     PRN Reason: Shortness Of Breath





Discontinued


   Enalapril Maleate [Vasotec] 20 mg PO BID@0800,2000


   amLODIPine [Norvasc] 10 mg PO DAILY@0700


Discharge Medication List





Acetaminophen [Tylenol] 500 - 1,000 mg PO BID PRN 09/21/19 [History]


Albuterol Inhaler [Ventolin Hfa Inhaler] 1 - 2 puff INHALATION RT-Q4H PRN 

09/21/19 [History]


Atenolol [Tenormin] 50 mg PO BID@0800,2000 09/21/19 [History]


Atorvastatin [Lipitor] 20 mg PO HS@2000 09/21/19 [History]


Cholecalciferol (Vitamin D3) [Vitamin D3] 2,000 unit PO DAILY@0800 09/21/19 

[History]


Clopidogrel Bisulfate [Plavix] 75 mg PO DAILY@0800 09/21/19 [History]


Folic Acid 0.4 mg PO DAILY@0800 09/21/19 [History]


Guaifenesin/Dextromethorphan [guaiFENesin DM] 2.5 - 5 ml PO Q4-6H PRN 09/21/19 

[History]


Loratadine 10 mg PO DAILY@0800 09/21/19 [History]


Melatonin 10 mg PO HS@2000 09/21/19 [History]


Mirtazapine 30 mg PO HS@2000 09/21/19 [History]


Doxazosin [Cardura] 4 mg PO DAILY@0800 11/24/19 [History]


Ipratropium-Albuterol Nebulize [Duoneb 0.5 mg-3 mg/3 ml Soln] 3 ml INHALATION 

RT-DAILY PRN 11/24/19 [History]


Ipratropium-Albuterol Nebulize [Duoneb 0.5 mg-3 mg/3 ml Soln] 3 ml INHALATION 

RT-QID 11/24/19 [History]


Isosorbide Mononitrate ER [Imdur] 30 mg PO DAILY #30 tab.er.24h 11/27/19 [Rx]


amLODIPine [Norvasc] 5 mg PO DAILY@0700 #30 tab 11/27/19 [Rx]


hydrALAZINE HCL [Apresoline] 75 mg PO BID #60 tab 11/27/19 [Rx]


predniSONE 40 mg PO DAILY  tab 11/27/19 [Rx]








Follow up Appointment(s)/Referral(s): 


Gaurang Mack MD [Primary Care Provider] - 1-2 days


Discharge Disposition: TRANSFER TO SNF/F

## 2019-11-27 NOTE — P.PN
Subjective


Progress Note Date: 11/27/19





This is an 86 year old  gentleman, history of dementia, history was 

obtained mainly from the medical record as the patient is quite confused this 

morning.  He has a history of coronary artery disease with prior bypass surgery,

prior stent placement, history of pacemaker with possible defibrillator, 

hypertension, hyperlipidemia, history of CVA, his daughter Larissa is the power of

.  Patient was brought to the hospital apparently because of symptoms of

progressively worsening shortness of breath.  His mentation is also noted to be 

much more worse than his usual.  While here in the hospital, the patient was 

admitted to the cardiac unit where he was noticed to have a bright red blood per

rectum when they were rolling the patient over in bed.  His chest x-ray on 

presentation here showed borderline cardiomegaly with increasing interstitial 

changes.  New small effusions with adjacent atelectasis and/or consolidation.  

Correlate for CHF with pulmonary vascular congestion.  EKG on presentation here 

shows a normal sinus rhythm with no acute changes.  CAT scan of the brain shows 

chronic CVA, age-related changes of atrophy and chronic small vessel ischemia.  

CTA of the brain showed cerebral vascular disease.  Blood pressure on arrival 

here 140/70 with a heart rate in the 50s, 91% on room air.  I pressure this 

morning 156/50 with a heart rate in the 60s, 95% on 4 L of oxygen.  He is 

afebrile.  White blood cell count is normal, hemoglobin 10.6, platelet count 

128.  Sodium 146, potassium 4.6, chloride 108, BUN is 45, creatinine 1.9 this 

morning, 1.6 on admission.  Troponins 0.021, 0.014, 0.019.  BNP level in 1990.  

TSH 2.2, cortisol level16.  At the time of my examination this morning, the 

patient is quite confused, he is unsure of where he is at, and he is pointing to

things in the room that do not exist.








11/26/2019


Patient was seen and examined today, continues to be confused, no complaints, 

denies having any chest pain or shortness of breath.  Blood pressure 118/50 with

a heart rate in the 50s, 95% on room air.  White blood cell count 11.3, 

hemoglobin 10.5, platelet count 131.  Echocardiogram with Doppler study was 

performed which revealed an ejection fraction of 40-45%, inferior lateral 

hypokinesia.  Patient continues to be on IV Lasix.








11/27/2019


Patient seen and examined this morning, blood pressure 134/60 with a heart rate 

in the 60s, 94% on 2 L of oxygen.  We will decrease the patient's Norvasc and 

increase the hydralazine today.








Objective





- Vital Signs


Vital signs: 


                                   Vital Signs











Temp  98.1 F   11/27/19 09:40


 


Pulse  64   11/27/19 09:44


 


Resp  16   11/27/19 09:40


 


BP  134/68   11/27/19 09:40


 


Pulse Ox  94 L  11/27/19 09:40








                                 Intake & Output











 11/26/19 11/27/19 11/27/19





 18:59 06:59 18:59


 


Intake Total 360 80 100


 


Balance 360 80 100


 


Weight  97 kg 


 


Intake:   


 


  Oral 360 80 100


 


Other:   


 


  Voiding Method Diaper Diaper Diaper





 Incontinent Incontinent Incontinent


 


  # Voids 2 1 1


 


  # Bowel Movements 2  0














- Exam





PHYSICAL EXAMINATION: 





GENERAL: 86 stroke  gentleman in no acute distress at the time of my 

examination





HEENT: Head is atraumatic, normocephalic.  Pupils equal, round.  Sclera 

anicteric. Conjunctiva are clear.  Mucous membranes of the mouth are moist.  

Neck is supple.  There is  elevated jugular venous pressure.  No carotid  bruit 

is heard.





HEART EXAMINATION: Heart S1 and S2 with soft systolic murmur is heard





CHEST EXAMINATION: On's reveal diminished air entry to the bases bilaterally





ABDOMEN: [ Soft, nontender. Bowel sounds are heard. No organomegaly noted].


 


EXTREMITIES:[ 2+ peripheral pulses with 1-2+  evidence of peripheral edema and 

no calf tenderness noted].





NEUROLOGIC [patient is awake, alert, confused





- Labs


CBC & Chem 7: 


                                 11/27/19 05:38





                                 11/25/19 05:31


Labs: 


                  Abnormal Lab Results - Last 24 Hours (Table)











  11/26/19 11/27/19 11/27/19 Range/Units





  22:17 05:38 07:11 


 


RBC  3.35 L  3.44 L   (4.30-5.90)  m/uL


 


Hgb  10.6 L  10.8 L   (13.0-17.5)  gm/dL


 


Hct  33.4 L  34.0 L   (39.0-53.0)  %


 


RDW  15.8 H  15.7 H   (11.5-15.5)  %


 


Plt Count  135 L  136 L   (150-450)  k/uL


 


POC Glucose (mg/dL)    141 H  (75-99)  mg/dL








                      Microbiology - Last 24 Hours (Table)











 11/24/19 08:00 Blood Culture - Preliminary





 Blood    No Growth after 72 hours














Assessment and Plan


Plan: 


Assessment and plan


#1 congestive heart failure, LV function unknown


#2 bright red bleeding per rectum, possible acute GI bleed.


#3 coronary artery disease with prior bypass surgery and stent placements exact 

details unavailable


#4 acute on chronic kidney injury


#5 history of a pacemaker/defibrillator


#6 hypertension


#7 hyperlipidemia


#8 dementia with a worsening in mentation





Plan


Echocardiogram with Doppler study revealed an ejection fraction of 40-45%.  We 

will decrease the Norvasc , and increase the dose of hydralazine .  We'll follow

 this patient along with you now on an as-needed basis only, please don't 

hesitate to call with any questions


DNP note has been reviewed, I agree with a documented findings and plan of care.

  Patient was seen and examined.

## 2019-11-27 NOTE — XR
EXAMINATION TYPE: XR chest 1V portable

 

DATE OF EXAM: 11/27/2019

 

COMPARISON: Prior chest x-ray 11/24/2019

 

HISTORY: Shortness of breath

 

TECHNIQUE: Single frontal view of the chest is obtained.

 

FINDINGS:  Generator in the left pectoral region, leads in the right atrium and ventricle are again s
een. There is no pneumothorax or evident effusion. Heart size is enlarged as on prior, patient is pos
t median sternotomy. Interstitium is mildly increased as on prior. No evident airspace disease. Patie
nt is rotated.

 

IMPRESSION:  Findings are similar to prior exam. Correlate for pulmonary venous hypertension and inte
rstitial edema, there may be some slight interval improvement in aeration, follow-up recommended

## 2021-06-01 NOTE — XR
EXAMINATION TYPE: XR chest 2V

 

DATE OF EXAM: 11/24/2019

 

COMPARISON: 9/21/2019

 

HISTORY: 86-year-old male with difficulty breathing

 

TECHNIQUE:  AP and lateral views

 

FINDINGS:  

Left anterior chest wall AICD generator with right atrial and right ventricular leads. Heart borderli
ne enlarged. Median sternotomy wires and post-CABG clips in the mediastinum. Diffuse interstitial den
sities, increased from prior with new small effusions and patchy posterior basilar opacity.

 

 

IMPRESSION:  

Borderline cardiomegaly with increasing interstitial changes. New small effusions with adjacent atele
ctasis and/or consolidation. Correlate for CHF with pulmonary vascular congestion. Last vitals:   Vitals:    09/12/19 0955   BP: 137/65   Pulse: 71   Resp: 26   Temp: 36.6  C (97.8  F)   SpO2: 100%     Patient's level of consciousness is drowsy  Spontaneous respirations: yes  Maintains airway independently: yes  Dentition unchanged: yes  Oropharynx: oropharynx clear of all foreign objects    QCDR Measures:  ASA# 20 - Surgical Safety Checklist: WHO surgical safety checklist completed prior to induction    PQRS# 430 - Adult PONV Prevention: 4558F - Pt received => 2 anti-emetic agents (different classes) preop & intraop  ASA# 8 - Peds PONV Prevention: NA - Not pediatric patient, not GA or 2 or more risk factors NOT present  PQRS# 424 - Jojo-op Temp Management: 4559F - At least one body temp DOCUMENTED => 35.5C or 95.9F within required timeframe  PQRS# 426 - PACU Transfer Protocol: - Transfer of care checklist used  ASA# 14 - Acute Post-op Pain: ASA14B - Patient did NOT experience pain >= 7 out of 10